# Patient Record
Sex: FEMALE | Race: WHITE | Employment: OTHER | URBAN - METROPOLITAN AREA
[De-identification: names, ages, dates, MRNs, and addresses within clinical notes are randomized per-mention and may not be internally consistent; named-entity substitution may affect disease eponyms.]

---

## 2018-03-22 ENCOUNTER — HOSPITAL ENCOUNTER (INPATIENT)
Age: 58
LOS: 4 days | Discharge: HOME | DRG: 871 | End: 2018-03-26
Attending: EMERGENCY MEDICINE | Admitting: INTERNAL MEDICINE
Payer: COMMERCIAL

## 2018-03-22 ENCOUNTER — APPOINTMENT (OUTPATIENT)
Dept: GENERAL RADIOLOGY | Age: 58
DRG: 871 | End: 2018-03-22
Payer: COMMERCIAL

## 2018-03-22 ENCOUNTER — APPOINTMENT (OUTPATIENT)
Dept: CT IMAGING | Age: 58
DRG: 871 | End: 2018-03-22
Payer: COMMERCIAL

## 2018-03-22 DIAGNOSIS — J18.9 PNEUMONIA DUE TO ORGANISM: ICD-10-CM

## 2018-03-22 DIAGNOSIS — E78.5 HYPERLIPIDEMIA, UNSPECIFIED HYPERLIPIDEMIA TYPE: ICD-10-CM

## 2018-03-22 DIAGNOSIS — R06.00 DYSPNEA AND RESPIRATORY ABNORMALITIES: Primary | ICD-10-CM

## 2018-03-22 DIAGNOSIS — C34.91 MALIGNANT NEOPLASM OF RIGHT LUNG, UNSPECIFIED PART OF LUNG (HCC): ICD-10-CM

## 2018-03-22 DIAGNOSIS — F41.9 ANXIETY: ICD-10-CM

## 2018-03-22 DIAGNOSIS — R09.02 HYPOXIA: ICD-10-CM

## 2018-03-22 DIAGNOSIS — R06.89 DYSPNEA AND RESPIRATORY ABNORMALITIES: Primary | ICD-10-CM

## 2018-03-22 PROBLEM — A41.9 SEPSIS (HCC): Status: ACTIVE | Noted: 2018-03-22

## 2018-03-22 LAB
ALBUMIN SERPL-MCNC: 3.6 G/DL (ref 3.5–5.2)
ALP BLD-CCNC: 97 U/L (ref 35–104)
ALT SERPL-CCNC: 16 U/L (ref 0–32)
ANION GAP SERPL CALCULATED.3IONS-SCNC: 16 MMOL/L (ref 7–16)
AST SERPL-CCNC: 21 U/L (ref 0–31)
BASOPHILS ABSOLUTE: 0.05 E9/L (ref 0–0.2)
BASOPHILS RELATIVE PERCENT: 0.4 % (ref 0–2)
BILIRUB SERPL-MCNC: 0.4 MG/DL (ref 0–1.2)
BUN BLDV-MCNC: 20 MG/DL (ref 6–20)
CALCIUM SERPL-MCNC: 8.9 MG/DL (ref 8.6–10.2)
CHLORIDE BLD-SCNC: 98 MMOL/L (ref 98–107)
CO2: 22 MMOL/L (ref 22–29)
CREAT SERPL-MCNC: 0.3 MG/DL (ref 0.5–1)
EOSINOPHILS ABSOLUTE: 0.09 E9/L (ref 0.05–0.5)
EOSINOPHILS RELATIVE PERCENT: 0.6 % (ref 0–6)
GFR AFRICAN AMERICAN: >60
GFR NON-AFRICAN AMERICAN: >60 ML/MIN/1.73
GLUCOSE BLD-MCNC: 99 MG/DL (ref 74–109)
HCT VFR BLD CALC: 37.8 % (ref 34–48)
HEMOGLOBIN: 12.9 G/DL (ref 11.5–15.5)
IMMATURE GRANULOCYTES #: 0.07 E9/L
IMMATURE GRANULOCYTES %: 0.5 % (ref 0–5)
INFLUENZA A BY PCR: NOT DETECTED
INFLUENZA B BY PCR: NOT DETECTED
LACTIC ACID: 0.8 MMOL/L (ref 0.5–2.2)
LYMPHOCYTES ABSOLUTE: 0.71 E9/L (ref 1.5–4)
LYMPHOCYTES RELATIVE PERCENT: 5.1 % (ref 20–42)
MCH RBC QN AUTO: 30.6 PG (ref 26–35)
MCHC RBC AUTO-ENTMCNC: 34.1 % (ref 32–34.5)
MCV RBC AUTO: 89.8 FL (ref 80–99.9)
MONOCYTES ABSOLUTE: 0.97 E9/L (ref 0.1–0.95)
MONOCYTES RELATIVE PERCENT: 7 % (ref 2–12)
NEUTROPHILS ABSOLUTE: 12.06 E9/L (ref 1.8–7.3)
NEUTROPHILS RELATIVE PERCENT: 86.4 % (ref 43–80)
PDW BLD-RTO: 14.2 FL (ref 11.5–15)
PLATELET # BLD: 311 E9/L (ref 130–450)
PMV BLD AUTO: 9.4 FL (ref 7–12)
POTASSIUM SERPL-SCNC: 4.3 MMOL/L (ref 3.5–5)
PRO-BNP: 66 PG/ML (ref 0–125)
PROCALCITONIN: 0.34 NG/ML (ref 0–0.08)
RBC # BLD: 4.21 E12/L (ref 3.5–5.5)
SODIUM BLD-SCNC: 136 MMOL/L (ref 132–146)
TOTAL PROTEIN: 7.2 G/DL (ref 6.4–8.3)
TROPONIN: <0.01 NG/ML (ref 0–0.03)
WBC # BLD: 14 E9/L (ref 4.5–11.5)

## 2018-03-22 PROCEDURE — 99285 EMERGENCY DEPT VISIT HI MDM: CPT

## 2018-03-22 PROCEDURE — 94761 N-INVAS EAR/PLS OXIMETRY MLT: CPT

## 2018-03-22 PROCEDURE — 2580000003 HC RX 258: Performed by: EMERGENCY MEDICINE

## 2018-03-22 PROCEDURE — 2580000003 HC RX 258: Performed by: INTERNAL MEDICINE

## 2018-03-22 PROCEDURE — 6360000004 HC RX CONTRAST MEDICATION: Performed by: RADIOLOGY

## 2018-03-22 PROCEDURE — 71045 X-RAY EXAM CHEST 1 VIEW: CPT

## 2018-03-22 PROCEDURE — 83605 ASSAY OF LACTIC ACID: CPT

## 2018-03-22 PROCEDURE — 87040 BLOOD CULTURE FOR BACTERIA: CPT

## 2018-03-22 PROCEDURE — 87081 CULTURE SCREEN ONLY: CPT

## 2018-03-22 PROCEDURE — 2060000000 HC ICU INTERMEDIATE R&B

## 2018-03-22 PROCEDURE — 85025 COMPLETE CBC W/AUTO DIFF WBC: CPT

## 2018-03-22 PROCEDURE — 6360000002 HC RX W HCPCS: Performed by: INTERNAL MEDICINE

## 2018-03-22 PROCEDURE — 87501 INFLUENZA DNA AMP PROB 1+: CPT

## 2018-03-22 PROCEDURE — 6370000000 HC RX 637 (ALT 250 FOR IP): Performed by: INTERNAL MEDICINE

## 2018-03-22 PROCEDURE — 87581 M.PNEUMON DNA AMP PROBE: CPT

## 2018-03-22 PROCEDURE — 93005 ELECTROCARDIOGRAM TRACING: CPT | Performed by: EMERGENCY MEDICINE

## 2018-03-22 PROCEDURE — 87503 INFLUENZA DNA AMP PROB ADDL: CPT

## 2018-03-22 PROCEDURE — 84484 ASSAY OF TROPONIN QUANT: CPT

## 2018-03-22 PROCEDURE — 6360000002 HC RX W HCPCS: Performed by: EMERGENCY MEDICINE

## 2018-03-22 PROCEDURE — 2580000003 HC RX 258: Performed by: SPECIALIST

## 2018-03-22 PROCEDURE — 96374 THER/PROPH/DIAG INJ IV PUSH: CPT

## 2018-03-22 PROCEDURE — 71275 CT ANGIOGRAPHY CHEST: CPT

## 2018-03-22 PROCEDURE — 83880 ASSAY OF NATRIURETIC PEPTIDE: CPT

## 2018-03-22 PROCEDURE — 80053 COMPREHEN METABOLIC PANEL: CPT

## 2018-03-22 PROCEDURE — 87798 DETECT AGENT NOS DNA AMP: CPT

## 2018-03-22 PROCEDURE — 84145 PROCALCITONIN (PCT): CPT

## 2018-03-22 PROCEDURE — 6360000002 HC RX W HCPCS: Performed by: SPECIALIST

## 2018-03-22 PROCEDURE — 36415 COLL VENOUS BLD VENIPUNCTURE: CPT

## 2018-03-22 PROCEDURE — 87486 CHLMYD PNEUM DNA AMP PROBE: CPT

## 2018-03-22 PROCEDURE — 87450 HC DIRECT STREP B ANTIGEN: CPT

## 2018-03-22 PROCEDURE — 87502 INFLUENZA DNA AMP PROBE: CPT

## 2018-03-22 RX ORDER — TRAMADOL HYDROCHLORIDE 50 MG/1
50 TABLET ORAL EVERY 6 HOURS PRN
COMMUNITY

## 2018-03-22 RX ORDER — LORAZEPAM 0.5 MG/1
0.5 TABLET ORAL EVERY 6 HOURS PRN
Status: DISCONTINUED | OUTPATIENT
Start: 2018-03-22 | End: 2018-03-26 | Stop reason: HOSPADM

## 2018-03-22 RX ORDER — ACETAMINOPHEN 325 MG/1
650 TABLET ORAL EVERY 4 HOURS PRN
Status: DISCONTINUED | OUTPATIENT
Start: 2018-03-22 | End: 2018-03-26 | Stop reason: HOSPADM

## 2018-03-22 RX ORDER — LEVOFLOXACIN 5 MG/ML
750 INJECTION, SOLUTION INTRAVENOUS EVERY 24 HOURS
Status: DISCONTINUED | OUTPATIENT
Start: 2018-03-22 | End: 2018-03-23

## 2018-03-22 RX ORDER — ONDANSETRON 2 MG/ML
4 INJECTION INTRAMUSCULAR; INTRAVENOUS EVERY 6 HOURS PRN
Status: DISCONTINUED | OUTPATIENT
Start: 2018-03-22 | End: 2018-03-26 | Stop reason: HOSPADM

## 2018-03-22 RX ORDER — SODIUM CHLORIDE 0.9 % (FLUSH) 0.9 %
10 SYRINGE (ML) INJECTION PRN
Status: DISCONTINUED | OUTPATIENT
Start: 2018-03-22 | End: 2018-03-26 | Stop reason: HOSPADM

## 2018-03-22 RX ORDER — HYDROCODONE BITARTRATE AND ACETAMINOPHEN 10; 325 MG/1; MG/1
1 TABLET ORAL EVERY 6 HOURS PRN
Status: DISCONTINUED | OUTPATIENT
Start: 2018-03-22 | End: 2018-03-23

## 2018-03-22 RX ORDER — SODIUM CHLORIDE 0.9 % (FLUSH) 0.9 %
10 SYRINGE (ML) INJECTION EVERY 12 HOURS SCHEDULED
Status: DISCONTINUED | OUTPATIENT
Start: 2018-03-22 | End: 2018-03-26 | Stop reason: HOSPADM

## 2018-03-22 RX ORDER — GABAPENTIN 600 MG/1
600 TABLET ORAL
Status: DISCONTINUED | OUTPATIENT
Start: 2018-03-22 | End: 2018-03-26 | Stop reason: HOSPADM

## 2018-03-22 RX ORDER — MORPHINE SULFATE 4 MG/ML
4 INJECTION, SOLUTION INTRAMUSCULAR; INTRAVENOUS ONCE
Status: COMPLETED | OUTPATIENT
Start: 2018-03-22 | End: 2018-03-22

## 2018-03-22 RX ORDER — LEVOTHYROXINE SODIUM 88 UG/1
88 TABLET ORAL
Status: DISCONTINUED | OUTPATIENT
Start: 2018-03-23 | End: 2018-03-26 | Stop reason: HOSPADM

## 2018-03-22 RX ORDER — SODIUM CHLORIDE 9 MG/ML
INJECTION, SOLUTION INTRAVENOUS CONTINUOUS
Status: DISCONTINUED | OUTPATIENT
Start: 2018-03-22 | End: 2018-03-26 | Stop reason: HOSPADM

## 2018-03-22 RX ADMIN — SODIUM CHLORIDE: 9 INJECTION, SOLUTION INTRAVENOUS at 14:14

## 2018-03-22 RX ADMIN — VANCOMYCIN HYDROCHLORIDE 1000 MG: 1 INJECTION, POWDER, LYOPHILIZED, FOR SOLUTION INTRAVENOUS at 15:15

## 2018-03-22 RX ADMIN — GABAPENTIN 600 MG: 600 TABLET ORAL at 15:21

## 2018-03-22 RX ADMIN — LORAZEPAM 0.5 MG: 0.5 TABLET ORAL at 21:15

## 2018-03-22 RX ADMIN — ENOXAPARIN SODIUM 40 MG: 40 INJECTION, SOLUTION INTRAVENOUS; SUBCUTANEOUS at 21:14

## 2018-03-22 RX ADMIN — ACETAMINOPHEN 650 MG: 325 TABLET ORAL at 22:46

## 2018-03-22 RX ADMIN — IOPAMIDOL 60 ML: 755 INJECTION, SOLUTION INTRAVENOUS at 08:29

## 2018-03-22 RX ADMIN — CEFEPIME HYDROCHLORIDE 2 G: 2 INJECTION, POWDER, FOR SOLUTION INTRAVENOUS at 12:05

## 2018-03-22 RX ADMIN — MORPHINE SULFATE 4 MG: 4 INJECTION, SOLUTION INTRAMUSCULAR; INTRAVENOUS at 11:14

## 2018-03-22 RX ADMIN — LEVOFLOXACIN 750 MG: 5 INJECTION, SOLUTION INTRAVENOUS at 15:15

## 2018-03-22 RX ADMIN — SODIUM CHLORIDE: 9 INJECTION, SOLUTION INTRAVENOUS at 21:17

## 2018-03-22 RX ADMIN — HYDROCODONE BITARTRATE AND ACETAMINOPHEN 1 TABLET: 10; 325 TABLET ORAL at 20:18

## 2018-03-22 RX ADMIN — HYDROCODONE BITARTRATE AND ACETAMINOPHEN 1 TABLET: 10; 325 TABLET ORAL at 14:14

## 2018-03-22 RX ADMIN — GABAPENTIN 600 MG: 600 TABLET ORAL at 21:14

## 2018-03-22 RX ADMIN — GABAPENTIN 600 MG: 600 TABLET ORAL at 18:37

## 2018-03-22 RX ADMIN — MEROPENEM 1 G: 1 INJECTION, POWDER, FOR SOLUTION INTRAVENOUS at 19:56

## 2018-03-22 ASSESSMENT — PAIN DESCRIPTION - FREQUENCY
FREQUENCY: CONTINUOUS
FREQUENCY: CONTINUOUS

## 2018-03-22 ASSESSMENT — PAIN DESCRIPTION - LOCATION
LOCATION: HIP

## 2018-03-22 ASSESSMENT — PAIN SCALES - GENERAL
PAINLEVEL_OUTOF10: 6
PAINLEVEL_OUTOF10: 6
PAINLEVEL_OUTOF10: 8
PAINLEVEL_OUTOF10: 0
PAINLEVEL_OUTOF10: 5
PAINLEVEL_OUTOF10: 7
PAINLEVEL_OUTOF10: 9
PAINLEVEL_OUTOF10: 5

## 2018-03-22 ASSESSMENT — PAIN DESCRIPTION - ORIENTATION
ORIENTATION: LEFT

## 2018-03-22 ASSESSMENT — PAIN DESCRIPTION - DESCRIPTORS
DESCRIPTORS: ACHING;CONSTANT;DISCOMFORT
DESCRIPTORS: ACHING;DISCOMFORT;CONSTANT
DESCRIPTORS: ACHING;CONSTANT;DISCOMFORT

## 2018-03-22 ASSESSMENT — PAIN DESCRIPTION - ONSET
ONSET: ON-GOING
ONSET: ON-GOING

## 2018-03-22 ASSESSMENT — PAIN DESCRIPTION - PAIN TYPE
TYPE: CHRONIC PAIN

## 2018-03-22 ASSESSMENT — PAIN DESCRIPTION - PROGRESSION: CLINICAL_PROGRESSION: NOT CHANGED

## 2018-03-22 NOTE — PLAN OF CARE
Problem: Falls - Risk of  Goal: Absence of falls  Outcome: Met This Shift      Problem: Respiratory Function - Compromised  Goal: Able to breathe comfortably  Able to breathe comfortably     Outcome: Met This Shift

## 2018-03-22 NOTE — ED NOTES
Medicated for right hip pain 7/10. Patient respirations relaxed.      Nirav Junior RN  03/22/18 2482

## 2018-03-22 NOTE — ED PROVIDER NOTES
Duration 86 ms    Q-T Interval 348 ms    QTc Calculation (Bazett) 455 ms    P Axis 65 degrees    R Axis -130 degrees    T Axis 55 degrees       RADIOLOGY:  Interpreted by Radiologist.  CTA PULMONARY W CONTRAST   Final Result   1. No pulmonary thousand. 2. Infiltrative right perihilar mass with obstruction of the bronchus   intermedius and consolidation and atelectasis of the right middle and   lower lobes. Masslike tissue extends across the infrahilar region   along the left bronchus. Significant volume loss in the right   hemithorax. 3. Multiple bilateral pulmonary nodules, larger on the left since the   prior study, concerning for metastatic disease. 4. Small left pleural effusion. Small pericardial effusion. 5. Osteoblastic lesions throughout the spine and left and right ribs,   concerning for metastatic disease. XR CHEST PORTABLE   Final Result   ALERT:  THIS IS AN ABNORMAL REPORT   1. Persistent right basilar opacification concerning for malignancy in   this patient with a history of cancer of the bronchus of the right   lower lobe. 2. Possible airspace disease in the left lung base. 3. Pulmonary nodules on comparison CT scan chest 2018, not well   appreciated on this study. EK BPM, tachy, normal sinus, no acute ST/T, no STEMI.     ------------------------- NURSING NOTES AND VITALS REVIEWED ---------------------------   The nursing notes within the ED encounter and vital signs as below have been reviewed.    /83   Pulse 101   Temp 97.6 °F (36.4 °C) (Oral)   Resp 18   Ht 5' 3\" (1.6 m)   Wt 111 lb 4.8 oz (50.5 kg)   SpO2 96%   BMI 19.72 kg/m²   Oxygen Saturation Interpretation: Normal, improved at rest and with O2    ---------------------------------------------------PHYSICAL EXAM--------------------------------------    Constitutional/General: Alert and oriented x3, well appearing, non toxic in NAD  Head: NC/AT  Eyes: PERRL, EOMI  HENT: Oropharynx clear  Neck: Supple, full ROM  Pulmonary: Right lung veery diminished. Left lung clear to auscultation, no wheezes, rales, or rhonchi. Not in respiratory distress. Cardiovascular: Regular rate. Regular rhythm, no murmurs, gallops, or rubs. 2+ distal pulses. Abdomen: Soft, non tender, non distended. No guarding, rebound, or rigidity. Extremities: Moves all extremities x 4. Warm and well perfused. No edema. Skin: Warm and dry without rash. Neurologic: GCS 15, no focal deficits, systemic strength 5/5 to all extremities symmetrically, CN II-XII grossly intact.   Psych: Normal Affect      ------------------------------ ED COURSE/MEDICAL DECISION MAKING----------------------  Medications   levofloxacin (LEVAQUIN) 750 MG/150ML infusion 750 mg (750 mg Intravenous New Bag 3/22/18 1515)   sodium chloride flush 0.9 % injection 10 mL (not administered)   sodium chloride flush 0.9 % injection 10 mL (not administered)   acetaminophen (TYLENOL) tablet 650 mg (not administered)   magnesium hydroxide (MILK OF MAGNESIA) 400 MG/5ML suspension 30 mL (not administered)   ondansetron (ZOFRAN) injection 4 mg (not administered)   enoxaparin (LOVENOX) injection 40 mg (not administered)   0.9 % sodium chloride infusion ( Intravenous New Bag 3/22/18 1414)   gabapentin (NEURONTIN) tablet 600 mg (600 mg Oral Given 3/22/18 1521)   HYDROcodone-acetaminophen (NORCO)  MG per tablet 1 tablet (1 tablet Oral Given 3/22/18 1414)   levothyroxine (SYNTHROID) tablet 88 mcg (not administered)   LORazepam (ATIVAN) tablet 0.5 mg (not administered)   cefepime (MAXIPIME) 2 g IVPB extended (mini-bag) (not administered)   vancomycin 1000 mg IVPB in 250 mL D5W addavial (not administered)   iopamidol (ISOVUE-370) 76 % injection 60 mL (60 mLs Intravenous Given 3/22/18 1358)   morphine injection 4 mg (4 mg Intravenous Given 3/22/18 1114)   vancomycin 1000 mg IVPB in 250 mL D5W addavial (1,000 mg Intravenous New Bag 3/22/18 1515)   cefepime (MAXIPIME) 2 g performed by me.              Salo Terrazas, DO  03/22/18 9220

## 2018-03-22 NOTE — ED NOTES
Patient 88% on RA, dropped to 84% with ambulation. Walked by [employer]. Witnessed by me as well.      Joseph Underwood,   03/22/18 9109

## 2018-03-22 NOTE — H&P
constipation, melena, hematochezia, hematemesis  Genitourinary: dysuria, hematuria, or increase in frequency  Musculoskeletal: lower extremity edema, myalgias, arthralgias, back pain  Integumentary: rashes, itching   Neurological: headache, lightheadedness, dizziness, confusion, syncope, numbness, tingling, focal weakness  Psychiatric: depression, suicidal ideation, or anxiety  Endocrine: unintentional weight change  Hematologic/Lymphatic: lymphadenopathy, easy bruising, easy bleeding   Allergic/Immunologic: recurrent infections      Objective  VITALS:  BP (!) 143/66   Pulse 117   Temp 97.7 °F (36.5 °C) (Oral)   Resp 16   Ht 5' 3\" (1.6 m)   Wt 112 lb 12.8 oz (51.2 kg)   SpO2 96%   BMI 19.98 kg/m²     Physical Exam:  General: awake, alert, oriented to person, place, time, and purpose, appears stated age, cooperative, no acute distress, pleasant, appropriate mood, nasal cannula oxygen  Eyes: conjunctivae/corneas clear, sclera non icteric, EOMI  Ears: no obvious scars, no lesions, no masses, hearing intact  Mouth: mucous membranes moist, no obvious oral sores  Head: normocephalic, atraumatic  Neck: no JVD, no adenopathy, no thyromegaly, neck is supple, trachea is midline  Back: ROM normal, no CVA tenderness. Chest: no pain on palpation  Lungs:  There are decreased breath sounds on the right  Heart: regular rate and regular rhythm, no murmur, normal S1, S2  Abdomen: soft, non-tender; bowel sounds normal; no masses, no organomegaly  Extremities: no lower extremity edema, extremities atraumatic, no cyanosis, no clubbing, 2+ pedal pulses palpated  Skin: normal color, normal texture, normal turgor, no rashes, no lesions  Neurologic:5/5 muscle strength throughout, normal muscle tone throughout, face symmetric, hearing intact, tongue midline, speech appropriate without slurring, sensation to fine touch intact in upper and lower extremities    Labs-   Lab Results   Component Value Date    WBC 19.4 (H) 03/23/2018    HGB 13.7 03/23/2018    HCT 39.7 03/23/2018     03/23/2018     03/23/2018    K 3.7 03/23/2018    CL 97 (L) 03/23/2018    CREATININE 0.4 (L) 03/23/2018    BUN 6 03/23/2018    CO2 22 03/23/2018    GLUCOSE 82 03/23/2018    ALT 16 03/23/2018    AST 18 03/23/2018     Lab Results   Component Value Date    TROPONINI <0.01 03/22/2018         Recent Radiological Studies:  CTA PULMONARY W CONTRAST   Final Result   1. No pulmonary thousand. 2. Infiltrative right perihilar mass with obstruction of the bronchus   intermedius and consolidation and atelectasis of the right middle and   lower lobes. Masslike tissue extends across the infrahilar region   along the left bronchus. Significant volume loss in the right   hemithorax. 3. Multiple bilateral pulmonary nodules, larger on the left since the   prior study, concerning for metastatic disease. 4. Small left pleural effusion. Small pericardial effusion. 5. Osteoblastic lesions throughout the spine and left and right ribs,   concerning for metastatic disease. XR CHEST PORTABLE   Final Result   ALERT:  THIS IS AN ABNORMAL REPORT   1. Persistent right basilar opacification concerning for malignancy in   this patient with a history of cancer of the bronchus of the right   lower lobe. 2. Possible airspace disease in the left lung base. 3. Pulmonary nodules on comparison CT scan chest 1/18/2018, not well   appreciated on this study. Assessment  Patient Active Problem List    Diagnosis Date Noted    Pneumonia 03/22/2018     Priority: High    Sepsis ([address] 03/22/2018     Priority: Medium    Hypoxia 03/22/2018     Priority: Medium    Lung cancer ([facility] [address]      Priority: Medium    Hyperlipidemia     Anxiety        Plan  Postobstructive pneumonia/Sepsis/hypoxia: CXR/CTA chest noted. ATB's Per ID. Blood cx. IVF. Pneumococcal/legionella urine ag's/RFA Negative. Procalcitonin elevated. Pulmonology consultation pending.   Lung cancer with

## 2018-03-23 LAB
ALBUMIN SERPL-MCNC: 3.4 G/DL (ref 3.5–5.2)
ALP BLD-CCNC: 100 U/L (ref 35–104)
ALT SERPL-CCNC: 16 U/L (ref 0–32)
ANION GAP SERPL CALCULATED.3IONS-SCNC: 16 MMOL/L (ref 7–16)
AST SERPL-CCNC: 18 U/L (ref 0–31)
BASOPHILS ABSOLUTE: 0.06 E9/L (ref 0–0.2)
BASOPHILS RELATIVE PERCENT: 0.3 % (ref 0–2)
BILIRUB SERPL-MCNC: 0.5 MG/DL (ref 0–1.2)
BUN BLDV-MCNC: 6 MG/DL (ref 6–20)
CALCIUM SERPL-MCNC: 8.7 MG/DL (ref 8.6–10.2)
CHLORIDE BLD-SCNC: 97 MMOL/L (ref 98–107)
CO2: 22 MMOL/L (ref 22–29)
CREAT SERPL-MCNC: 0.4 MG/DL (ref 0.5–1)
EOSINOPHILS ABSOLUTE: 0.11 E9/L (ref 0.05–0.5)
EOSINOPHILS RELATIVE PERCENT: 0.6 % (ref 0–6)
FILM ARRAY ADENOVIRUS: NORMAL
FILM ARRAY BORDETELLA PERTUSSIS: NORMAL
FILM ARRAY CHLAMYDOPHILIA PNEUMONIAE: NORMAL
FILM ARRAY CORONAVIRUS 229E: NORMAL
FILM ARRAY CORONAVIRUS HKU1: NORMAL
FILM ARRAY CORONAVIRUS NL63: NORMAL
FILM ARRAY CORONAVIRUS OC43: NORMAL
FILM ARRAY INFLUENZA A VIRUS 09H1: NORMAL
FILM ARRAY INFLUENZA A VIRUS H1: NORMAL
FILM ARRAY INFLUENZA A VIRUS H3: NORMAL
FILM ARRAY INFLUENZA A VIRUS: NORMAL
FILM ARRAY INFLUENZA B: NORMAL
FILM ARRAY METAPNEUMOVIRUS: NORMAL
FILM ARRAY MYCOPLASMA PNEUMONIAE: NORMAL
FILM ARRAY PARAINFLUENZA VIRUS 1: NORMAL
FILM ARRAY PARAINFLUENZA VIRUS 2: NORMAL
FILM ARRAY PARAINFLUENZA VIRUS 3: NORMAL
FILM ARRAY PARAINFLUENZA VIRUS 4: NORMAL
FILM ARRAY RESPIRATORY SYNCITIAL VIRUS: NORMAL
FILM ARRAY RHINOVIRUS/ENTEROVIRUS: NORMAL
GFR AFRICAN AMERICAN: >60
GFR NON-AFRICAN AMERICAN: >60 ML/MIN/1.73
GLUCOSE BLD-MCNC: 82 MG/DL (ref 74–109)
HCT VFR BLD CALC: 39.7 % (ref 34–48)
HEMOGLOBIN: 13.7 G/DL (ref 11.5–15.5)
IMMATURE GRANULOCYTES #: 0.1 E9/L
IMMATURE GRANULOCYTES %: 0.5 % (ref 0–5)
L. PNEUMOPHILA SEROGP 1 UR AG: NORMAL
LYMPHOCYTES ABSOLUTE: 1 E9/L (ref 1.5–4)
LYMPHOCYTES RELATIVE PERCENT: 5.2 % (ref 20–42)
MCH RBC QN AUTO: 30.7 PG (ref 26–35)
MCHC RBC AUTO-ENTMCNC: 34.5 % (ref 32–34.5)
MCV RBC AUTO: 89 FL (ref 80–99.9)
MONOCYTES ABSOLUTE: 1.38 E9/L (ref 0.1–0.95)
MONOCYTES RELATIVE PERCENT: 7.1 % (ref 2–12)
NEUTROPHILS ABSOLUTE: 16.72 E9/L (ref 1.8–7.3)
NEUTROPHILS RELATIVE PERCENT: 86.3 % (ref 43–80)
PDW BLD-RTO: 13.9 FL (ref 11.5–15)
PLATELET # BLD: 357 E9/L (ref 130–450)
PMV BLD AUTO: 9.6 FL (ref 7–12)
POTASSIUM REFLEX MAGNESIUM: 3.7 MMOL/L (ref 3.5–5)
RBC # BLD: 4.46 E12/L (ref 3.5–5.5)
SODIUM BLD-SCNC: 135 MMOL/L (ref 132–146)
STREP PNEUMONIAE ANTIGEN, URINE: NORMAL
TOTAL PROTEIN: 7 G/DL (ref 6.4–8.3)
WBC # BLD: 19.4 E9/L (ref 4.5–11.5)

## 2018-03-23 PROCEDURE — 6360000002 HC RX W HCPCS: Performed by: INTERNAL MEDICINE

## 2018-03-23 PROCEDURE — 94667 MNPJ CHEST WALL 1ST: CPT

## 2018-03-23 PROCEDURE — 2060000000 HC ICU INTERMEDIATE R&B

## 2018-03-23 PROCEDURE — 80053 COMPREHEN METABOLIC PANEL: CPT

## 2018-03-23 PROCEDURE — 6360000002 HC RX W HCPCS: Performed by: SPECIALIST

## 2018-03-23 PROCEDURE — 6370000000 HC RX 637 (ALT 250 FOR IP): Performed by: INTERNAL MEDICINE

## 2018-03-23 PROCEDURE — 85025 COMPLETE CBC W/AUTO DIFF WBC: CPT

## 2018-03-23 PROCEDURE — 36415 COLL VENOUS BLD VENIPUNCTURE: CPT

## 2018-03-23 PROCEDURE — 2580000003 HC RX 258: Performed by: INTERNAL MEDICINE

## 2018-03-23 PROCEDURE — 2580000003 HC RX 258: Performed by: SPECIALIST

## 2018-03-23 PROCEDURE — 94640 AIRWAY INHALATION TREATMENT: CPT

## 2018-03-23 PROCEDURE — 2700000000 HC OXYGEN THERAPY PER DAY

## 2018-03-23 RX ORDER — ALBUTEROL SULFATE 2.5 MG/3ML
2.5 SOLUTION RESPIRATORY (INHALATION) 4 TIMES DAILY
Status: DISCONTINUED | OUTPATIENT
Start: 2018-03-23 | End: 2018-03-26

## 2018-03-23 RX ORDER — GUAIFENESIN 400 MG/1
400 TABLET ORAL 4 TIMES DAILY
Status: DISCONTINUED | OUTPATIENT
Start: 2018-03-23 | End: 2018-03-26 | Stop reason: HOSPADM

## 2018-03-23 RX ORDER — OXYCODONE HYDROCHLORIDE AND ACETAMINOPHEN 5; 325 MG/1; MG/1
2 TABLET ORAL EVERY 4 HOURS PRN
Status: DISCONTINUED | OUTPATIENT
Start: 2018-03-23 | End: 2018-03-26 | Stop reason: HOSPADM

## 2018-03-23 RX ORDER — OXYCODONE HYDROCHLORIDE AND ACETAMINOPHEN 5; 325 MG/1; MG/1
1 TABLET ORAL EVERY 4 HOURS PRN
Status: DISCONTINUED | OUTPATIENT
Start: 2018-03-23 | End: 2018-03-26 | Stop reason: HOSPADM

## 2018-03-23 RX ADMIN — GUAIFENESIN 400 MG: 400 TABLET ORAL at 19:00

## 2018-03-23 RX ADMIN — OXYCODONE HYDROCHLORIDE AND ACETAMINOPHEN 1 TABLET: 5; 325 TABLET ORAL at 16:29

## 2018-03-23 RX ADMIN — GABAPENTIN 600 MG: 600 TABLET ORAL at 11:08

## 2018-03-23 RX ADMIN — GABAPENTIN 600 MG: 600 TABLET ORAL at 16:32

## 2018-03-23 RX ADMIN — ALBUTEROL SULFATE 2.5 MG: 2.5 SOLUTION RESPIRATORY (INHALATION) at 21:33

## 2018-03-23 RX ADMIN — GABAPENTIN 600 MG: 600 TABLET ORAL at 20:50

## 2018-03-23 RX ADMIN — HYDROCODONE BITARTRATE AND ACETAMINOPHEN 1 TABLET: 10; 325 TABLET ORAL at 08:34

## 2018-03-23 RX ADMIN — HYDROCODONE BITARTRATE AND ACETAMINOPHEN 1 TABLET: 10; 325 TABLET ORAL at 02:25

## 2018-03-23 RX ADMIN — ACETAMINOPHEN 650 MG: 325 TABLET ORAL at 11:08

## 2018-03-23 RX ADMIN — ALBUTEROL SULFATE 2.5 MG: 2.5 SOLUTION RESPIRATORY (INHALATION) at 17:25

## 2018-03-23 RX ADMIN — GUAIFENESIN 400 MG: 400 TABLET ORAL at 20:50

## 2018-03-23 RX ADMIN — MEROPENEM 1 G: 1 INJECTION, POWDER, FOR SOLUTION INTRAVENOUS at 12:36

## 2018-03-23 RX ADMIN — GABAPENTIN 600 MG: 600 TABLET ORAL at 19:04

## 2018-03-23 RX ADMIN — GABAPENTIN 600 MG: 600 TABLET ORAL at 14:27

## 2018-03-23 RX ADMIN — GABAPENTIN 600 MG: 600 TABLET ORAL at 05:37

## 2018-03-23 RX ADMIN — OXYCODONE HYDROCHLORIDE AND ACETAMINOPHEN 2 TABLET: 5; 325 TABLET ORAL at 12:36

## 2018-03-23 RX ADMIN — LORAZEPAM 0.5 MG: 0.5 TABLET ORAL at 05:39

## 2018-03-23 RX ADMIN — LEVOTHYROXINE SODIUM 88 MCG: 0.09 TABLET ORAL at 05:37

## 2018-03-23 RX ADMIN — OXYCODONE HYDROCHLORIDE AND ACETAMINOPHEN 1 TABLET: 5; 325 TABLET ORAL at 20:49

## 2018-03-23 RX ADMIN — ACETAMINOPHEN 650 MG: 325 TABLET ORAL at 04:28

## 2018-03-23 RX ADMIN — MEROPENEM 1 G: 1 INJECTION, POWDER, FOR SOLUTION INTRAVENOUS at 20:49

## 2018-03-23 RX ADMIN — SODIUM CHLORIDE: 9 INJECTION, SOLUTION INTRAVENOUS at 22:33

## 2018-03-23 RX ADMIN — MEROPENEM 1 G: 1 INJECTION, POWDER, FOR SOLUTION INTRAVENOUS at 04:41

## 2018-03-23 RX ADMIN — LORAZEPAM 0.5 MG: 0.5 TABLET ORAL at 23:09

## 2018-03-23 RX ADMIN — ENOXAPARIN SODIUM 40 MG: 40 INJECTION, SOLUTION INTRAVENOUS; SUBCUTANEOUS at 20:50

## 2018-03-23 ASSESSMENT — PAIN SCALES - GENERAL
PAINLEVEL_OUTOF10: 7
PAINLEVEL_OUTOF10: 8
PAINLEVEL_OUTOF10: 5
PAINLEVEL_OUTOF10: 0
PAINLEVEL_OUTOF10: 0
PAINLEVEL_OUTOF10: 3
PAINLEVEL_OUTOF10: 10
PAINLEVEL_OUTOF10: 10
PAINLEVEL_OUTOF10: 8
PAINLEVEL_OUTOF10: 6
PAINLEVEL_OUTOF10: 10

## 2018-03-23 ASSESSMENT — PAIN DESCRIPTION - DESCRIPTORS
DESCRIPTORS: ACHING;DISCOMFORT
DESCRIPTORS: ACHING;DISCOMFORT;CONSTANT
DESCRIPTORS: ACHING;DISCOMFORT;CONSTANT
DESCRIPTORS: ACHING;CONSTANT;DISCOMFORT

## 2018-03-23 ASSESSMENT — PAIN DESCRIPTION - ORIENTATION
ORIENTATION: LEFT

## 2018-03-23 ASSESSMENT — PAIN DESCRIPTION - LOCATION
LOCATION: HIP

## 2018-03-23 ASSESSMENT — PAIN DESCRIPTION - PAIN TYPE
TYPE: CHRONIC PAIN

## 2018-03-23 NOTE — PROGRESS NOTES
Results   Component Value Date     03/23/2018    K 3.7 03/23/2018    CL 97 03/23/2018    CO2 22 03/23/2018    BUN 6 03/23/2018    CREATININE 0.4 03/23/2018    GFRAA >60 03/23/2018    LABGLOM >60 03/23/2018    GLUCOSE 82 03/23/2018    PROT 7.0 03/23/2018    LABALBU 3.4 03/23/2018    CALCIUM 8.7 03/23/2018    BILITOT 0.5 03/23/2018    ALKPHOS 100 03/23/2018    AST 18 03/23/2018    ALT 16 03/23/2018       BP (!) 143/66   Pulse 117   Temp 97.7 °F (36.5 °C) (Oral)   Resp 16   Ht 5' 3\" (1.6 m)   Wt 112 lb 12.8 oz (51.2 kg)   SpO2 96%   BMI 19.98 kg/m²     Physical Exam  Const/Neuro- unchanged, no signs of acute distress, Alert  ENMT- Within Normal Limits, Normocephalic, mucous membranes pink/moist, No thrush  Neck: Neck supple  Heart- Regular, Rate, Rhythm- no murmur appreciated. Lungs- DECREASE BS ON RT TO ascultation. Respirations even and nonlabored. Abdomen- Soft, bowel sounds positive, non tender  Musculo/Extremities-  Equal and symmetrical, no edema. No tenderness. Skin:  Warm and dry, free from rashes. Cultures reviewed    Radiology reviewed  CTA PULMONARY W CONTRAST   Final Result   1. No pulmonary thousand. 2. Infiltrative right perihilar mass with obstruction of the bronchus   intermedius and consolidation and atelectasis of the right middle and   lower lobes. Masslike tissue extends across the infrahilar region   along the left bronchus. Significant volume loss in the right   hemithorax. 3. Multiple bilateral pulmonary nodules, larger on the left since the   prior study, concerning for metastatic disease. 4. Small left pleural effusion. Small pericardial effusion. 5. Osteoblastic lesions throughout the spine and left and right ribs,   concerning for metastatic disease. XR CHEST PORTABLE   Final Result   ALERT:  THIS IS AN ABNORMAL REPORT   1.  Persistent right basilar opacification concerning for malignancy in   this patient with a history of cancer of the

## 2018-03-23 NOTE — PROGRESS NOTES
Subjective: The patient is awake and alert. No problems overnight. Has cough, dyspnea and shortness of breath, improved with being on supplemental oxygen. Denies chest pain, angina, abdominal discomfort, nausea or vomiting. Tolerating diet. Objective:    BP (!) 143/66   Pulse 117   Temp 97.7 °F (36.5 °C) (Oral)   Resp 16   Ht 5' 3\" (1.6 m)   Wt 112 lb 12.8 oz (51.2 kg)   SpO2 96%   BMI 19.98 kg/m²     General: NAD, 2L nc  HEENT: No thrush or mucositis, EOMI, PERRLA  Heart:  Sinus tachycardia, no murmurs, gallops, or rubs.   Lungs:  Decreased BS bilaterally worse on the right, with inspiratory rales and rhonchi on the right  Abd: bowel sounds present, nontender, nondistended, no masses  Extrem:  No clubbing, cyanosis, or edema  Lymphatics: No palpable adenopathy in cervical and supraclavicular regions  Skin: Intact, no petechia or purpura    CBC with Differential:    Lab Results   Component Value Date    WBC 19.4 03/23/2018    RBC 4.46 03/23/2018    HGB 13.7 03/23/2018    HCT 39.7 03/23/2018     03/23/2018    MCV 89.0 03/23/2018    MCH 30.7 03/23/2018    MCHC 34.5 03/23/2018    RDW 13.9 03/23/2018    LYMPHOPCT 5.2 03/23/2018    MONOPCT 7.1 03/23/2018    BASOPCT 0.3 03/23/2018    MONOSABS 1.38 03/23/2018    LYMPHSABS 1.00 03/23/2018    EOSABS 0.11 03/23/2018    BASOSABS 0.06 03/23/2018     CMP:    Lab Results   Component Value Date     03/23/2018    K 3.7 03/23/2018    CL 97 03/23/2018    CO2 22 03/23/2018    BUN 6 03/23/2018    CREATININE 0.4 03/23/2018    GFRAA >60 03/23/2018    LABGLOM >60 03/23/2018    GLUCOSE 82 03/23/2018    PROT 7.0 03/23/2018    LABALBU 3.4 03/23/2018    CALCIUM 8.7 03/23/2018    BILITOT 0.5 03/23/2018    ALKPHOS 100 03/23/2018    AST 18 03/23/2018    ALT 16 03/23/2018      [id number]}     Current Facility-Administered Medications:     sodium chloride flush 0.9 % injection 10 mL, 10 mL, Intravenous, 2 times per day, Zeus Oviedo, DO    sodium chloride flush 0.9 % injection 10 mL, 10 mL, Intravenous, PRN, Zeus Fowlerino, DO    acetaminophen (TYLENOL) tablet 650 mg, 650 mg, Oral, Q4H PRN, Zeus E Volino, DO, 650 mg at 03/23/18 1108    magnesium hydroxide (MILK OF MAGNESIA) 400 MG/5ML suspension 30 mL, 30 mL, Oral, Daily PRN, Zeus ROBERTO Volino, DO    ondansetron (ZOFRAN) injection 4 mg, 4 mg, Intravenous, Q6H PRN, Zeus ROBERTO Volino, DO    enoxaparin (LOVENOX) injection 40 mg, 40 mg, Subcutaneous, Daily, Zeus Oviedo, , 40 mg at 03/22/18 2114    0.9 % sodium chloride infusion, , Intravenous, Continuous, Zeus Oviedo DO, Last Rate: 100 mL/hr at 03/22/18 2117    gabapentin (NEURONTIN) tablet 600 mg, 600 mg, Oral, 6x Daily, Zeus Fowlerino, DO, 600 mg at 03/23/18 1108    HYDROcodone-acetaminophen (NORCO)  MG per tablet 1 tablet, 1 tablet, Oral, Q6H PRN, Zeus Oviedo, DO, 1 tablet at 03/23/18 0834    levothyroxine (SYNTHROID) tablet 88 mcg, 88 mcg, Oral, QAM AC, Zeus Fowlerino, DO, 88 mcg at 03/23/18 0537    LORazepam (ATIVAN) tablet 0.5 mg, 0.5 mg, Oral, Q6H PRN, Zeus Fowlerino, DO, 0.5 mg at 03/23/18 0539    meropenem (MERREM) 1 g in sodium chloride 0.9 % 100 mL IVPB, 1 g, Intravenous, Q8H, Lizy Jane MD, Stopped at 03/23/18 0510    Assessment:    Principal Problem:    Pneumonia  Active Problems:    Lung cancer ([facility] [address]    Sepsis ([facility] [address]    Hypoxia  Resolved Problems:    * No resolved hospital problems. *    57 yo female known to Dr. Sheri Dey with stage IV lung adenocarcinoma who has been on second line immunotherapy with Opdivo since 1/26/18, last dose on 3/9/18. Admitted with increased cough, shortness of breath, dyspnea and chills. Plan:  Discussed there is evidence of disease progression on CTA chest from 3/22 with occluded right bronchus intermedius with RML and RLL atelectasis and increased left lung nodules. Reviewed images personally. She will be following up with Dr. Sheri Dey regarding change of therapy next week.     Pulmonary consulted, may benefit from bronchoscopic evaluation. Cont supportive care, supplemental oxygen and abx per ID.        Electronically signed by Kiet Huynh MD on 3/23/2018 at 12:07 PM

## 2018-03-24 LAB
ALBUMIN SERPL-MCNC: 3.2 G/DL (ref 3.5–5.2)
ALP BLD-CCNC: 94 U/L (ref 35–104)
ALT SERPL-CCNC: 20 U/L (ref 0–32)
ANION GAP SERPL CALCULATED.3IONS-SCNC: 16 MMOL/L (ref 7–16)
AST SERPL-CCNC: 16 U/L (ref 0–31)
BASOPHILS ABSOLUTE: 0.07 E9/L (ref 0–0.2)
BASOPHILS RELATIVE PERCENT: 0.4 % (ref 0–2)
BILIRUB SERPL-MCNC: 0.3 MG/DL (ref 0–1.2)
BUN BLDV-MCNC: 7 MG/DL (ref 6–20)
CALCIUM SERPL-MCNC: 8.6 MG/DL (ref 8.6–10.2)
CHLORIDE BLD-SCNC: 98 MMOL/L (ref 98–107)
CO2: 23 MMOL/L (ref 22–29)
CREAT SERPL-MCNC: 0.4 MG/DL (ref 0.5–1)
EOSINOPHILS ABSOLUTE: 0.11 E9/L (ref 0.05–0.5)
EOSINOPHILS RELATIVE PERCENT: 0.7 % (ref 0–6)
GFR AFRICAN AMERICAN: >60
GFR NON-AFRICAN AMERICAN: >60 ML/MIN/1.73
GLUCOSE BLD-MCNC: 120 MG/DL (ref 74–109)
HCT VFR BLD CALC: 38.8 % (ref 34–48)
HEMOGLOBIN: 13.4 G/DL (ref 11.5–15.5)
IMMATURE GRANULOCYTES #: 0.09 E9/L
IMMATURE GRANULOCYTES %: 0.6 % (ref 0–5)
LYMPHOCYTES ABSOLUTE: 0.82 E9/L (ref 1.5–4)
LYMPHOCYTES RELATIVE PERCENT: 5.2 % (ref 20–42)
MAGNESIUM: 1.9 MG/DL (ref 1.6–2.6)
MCH RBC QN AUTO: 30.7 PG (ref 26–35)
MCHC RBC AUTO-ENTMCNC: 34.5 % (ref 32–34.5)
MCV RBC AUTO: 88.8 FL (ref 80–99.9)
MONOCYTES ABSOLUTE: 1.2 E9/L (ref 0.1–0.95)
MONOCYTES RELATIVE PERCENT: 7.6 % (ref 2–12)
MRSA CULTURE ONLY: NORMAL
NEUTROPHILS ABSOLUTE: 13.58 E9/L (ref 1.8–7.3)
NEUTROPHILS RELATIVE PERCENT: 85.5 % (ref 43–80)
PDW BLD-RTO: 13.7 FL (ref 11.5–15)
PLATELET # BLD: 321 E9/L (ref 130–450)
PMV BLD AUTO: 9.3 FL (ref 7–12)
POTASSIUM REFLEX MAGNESIUM: 3.1 MMOL/L (ref 3.5–5)
RBC # BLD: 4.37 E12/L (ref 3.5–5.5)
SODIUM BLD-SCNC: 137 MMOL/L (ref 132–146)
TOTAL PROTEIN: 6.5 G/DL (ref 6.4–8.3)
WBC # BLD: 15.9 E9/L (ref 4.5–11.5)

## 2018-03-24 PROCEDURE — 6370000000 HC RX 637 (ALT 250 FOR IP): Performed by: INTERNAL MEDICINE

## 2018-03-24 PROCEDURE — 36415 COLL VENOUS BLD VENIPUNCTURE: CPT

## 2018-03-24 PROCEDURE — 2580000003 HC RX 258: Performed by: SPECIALIST

## 2018-03-24 PROCEDURE — 85025 COMPLETE CBC W/AUTO DIFF WBC: CPT

## 2018-03-24 PROCEDURE — 83735 ASSAY OF MAGNESIUM: CPT

## 2018-03-24 PROCEDURE — 80053 COMPREHEN METABOLIC PANEL: CPT

## 2018-03-24 PROCEDURE — 2060000000 HC ICU INTERMEDIATE R&B

## 2018-03-24 PROCEDURE — 94668 MNPJ CHEST WALL SBSQ: CPT

## 2018-03-24 PROCEDURE — 94640 AIRWAY INHALATION TREATMENT: CPT

## 2018-03-24 PROCEDURE — 6360000002 HC RX W HCPCS: Performed by: SPECIALIST

## 2018-03-24 PROCEDURE — 2700000000 HC OXYGEN THERAPY PER DAY

## 2018-03-24 PROCEDURE — 6360000002 HC RX W HCPCS: Performed by: INTERNAL MEDICINE

## 2018-03-24 RX ORDER — POTASSIUM CHLORIDE 20 MEQ/1
20 TABLET, EXTENDED RELEASE ORAL 2 TIMES DAILY WITH MEALS
Status: DISCONTINUED | OUTPATIENT
Start: 2018-03-24 | End: 2018-03-26 | Stop reason: HOSPADM

## 2018-03-24 RX ADMIN — ENOXAPARIN SODIUM 40 MG: 40 INJECTION, SOLUTION INTRAVENOUS; SUBCUTANEOUS at 20:40

## 2018-03-24 RX ADMIN — GUAIFENESIN 400 MG: 400 TABLET ORAL at 20:39

## 2018-03-24 RX ADMIN — ALBUTEROL SULFATE 2.5 MG: 2.5 SOLUTION RESPIRATORY (INHALATION) at 12:19

## 2018-03-24 RX ADMIN — ALBUTEROL SULFATE 2.5 MG: 2.5 SOLUTION RESPIRATORY (INHALATION) at 21:54

## 2018-03-24 RX ADMIN — OXYCODONE HYDROCHLORIDE AND ACETAMINOPHEN 2 TABLET: 5; 325 TABLET ORAL at 05:00

## 2018-03-24 RX ADMIN — GUAIFENESIN 400 MG: 400 TABLET ORAL at 12:58

## 2018-03-24 RX ADMIN — OXYCODONE HYDROCHLORIDE AND ACETAMINOPHEN 2 TABLET: 5; 325 TABLET ORAL at 09:17

## 2018-03-24 RX ADMIN — MEROPENEM 1 G: 1 INJECTION, POWDER, FOR SOLUTION INTRAVENOUS at 20:39

## 2018-03-24 RX ADMIN — GABAPENTIN 600 MG: 600 TABLET ORAL at 20:39

## 2018-03-24 RX ADMIN — GABAPENTIN 600 MG: 600 TABLET ORAL at 12:58

## 2018-03-24 RX ADMIN — GUAIFENESIN 400 MG: 400 TABLET ORAL at 18:46

## 2018-03-24 RX ADMIN — GABAPENTIN 600 MG: 600 TABLET ORAL at 18:46

## 2018-03-24 RX ADMIN — OXYCODONE HYDROCHLORIDE AND ACETAMINOPHEN 2 TABLET: 5; 325 TABLET ORAL at 21:21

## 2018-03-24 RX ADMIN — LEVOTHYROXINE SODIUM 88 MCG: 0.09 TABLET ORAL at 06:21

## 2018-03-24 RX ADMIN — MEROPENEM 1 G: 1 INJECTION, POWDER, FOR SOLUTION INTRAVENOUS at 14:46

## 2018-03-24 RX ADMIN — GUAIFENESIN 400 MG: 400 TABLET ORAL at 09:16

## 2018-03-24 RX ADMIN — OXYCODONE HYDROCHLORIDE AND ACETAMINOPHEN 2 TABLET: 5; 325 TABLET ORAL at 12:58

## 2018-03-24 RX ADMIN — OXYCODONE HYDROCHLORIDE AND ACETAMINOPHEN 2 TABLET: 5; 325 TABLET ORAL at 17:14

## 2018-03-24 RX ADMIN — POTASSIUM CHLORIDE 20 MEQ: 20 TABLET, EXTENDED RELEASE ORAL at 09:16

## 2018-03-24 RX ADMIN — OXYCODONE HYDROCHLORIDE AND ACETAMINOPHEN 2 TABLET: 5; 325 TABLET ORAL at 00:53

## 2018-03-24 RX ADMIN — GABAPENTIN 600 MG: 600 TABLET ORAL at 09:16

## 2018-03-24 RX ADMIN — ALBUTEROL SULFATE 2.5 MG: 2.5 SOLUTION RESPIRATORY (INHALATION) at 16:53

## 2018-03-24 RX ADMIN — ALBUTEROL SULFATE 2.5 MG: 2.5 SOLUTION RESPIRATORY (INHALATION) at 08:19

## 2018-03-24 RX ADMIN — MEROPENEM 1 G: 1 INJECTION, POWDER, FOR SOLUTION INTRAVENOUS at 04:07

## 2018-03-24 RX ADMIN — POTASSIUM CHLORIDE 20 MEQ: 20 TABLET, EXTENDED RELEASE ORAL at 18:46

## 2018-03-24 RX ADMIN — GABAPENTIN 600 MG: 600 TABLET ORAL at 06:21

## 2018-03-24 RX ADMIN — GABAPENTIN 600 MG: 600 TABLET ORAL at 17:14

## 2018-03-24 ASSESSMENT — PAIN SCALES - GENERAL
PAINLEVEL_OUTOF10: 0
PAINLEVEL_OUTOF10: 10
PAINLEVEL_OUTOF10: 8
PAINLEVEL_OUTOF10: 0
PAINLEVEL_OUTOF10: 8
PAINLEVEL_OUTOF10: 10

## 2018-03-24 ASSESSMENT — PAIN DESCRIPTION - PAIN TYPE: TYPE: CHRONIC PAIN

## 2018-03-24 ASSESSMENT — PAIN DESCRIPTION - LOCATION: LOCATION: HIP

## 2018-03-24 ASSESSMENT — PAIN DESCRIPTION - DESCRIPTORS: DESCRIPTORS: ACHING;DISCOMFORT;CONSTANT

## 2018-03-24 ASSESSMENT — PAIN DESCRIPTION - ORIENTATION: ORIENTATION: LEFT

## 2018-03-24 NOTE — CONSULTS
[facility] CONSULT NOTE  Reason for Consult:  SOB  Requesting Physician:  [facility]    Chief Complaint   Patient presents with    Shortness of Breath     hx lung CA. History Obtained From:  PT AND DAUGHTER    HISTORY OF PRESENT ILLNESS              The patient is a [id number] with significant past medical history of CA LUNG who presents with   SOB,CHILLS AND ROJAS. PT HAD DX IN June OF 2017 VIA MAL EFFUSION. STARTED ON CHEMO AND AFTER MAINTENANE  SHE WAS ON HER THIRD ROUND OF IMMUNOTHERAPY AND DEVELOPED SOB. CT SCAN SHOW FT LUNG   MASS AND OBSTRUCTION OF RT AIRWAY C ATELECTASIS AND LESION THAT ARE NEW ON LEFT CONSISTANT  C METS. IN ER PT GIVEN LEVAQUIN AND VANCO AND CEFEPIME.  PT WAS TOLD NOT TO GET FLU SHOT      Past Medical History:   Diagnosis Date    Anxiety     Hyperlipidemia     Lung cancer ([address]     right lung       Past Surgical History:   Procedure Laterality Date    APPENDECTOMY         Current Facility-Administered Medications   Medication Dose Route Frequency Provider Last Rate Last Dose    levofloxacin (LEVAQUIN) 750 MG/150ML infusion 750 mg  750 mg Intravenous Q24H Zeus Oviedo,  mL/hr at 03/22/18 1515 750 mg at 03/22/18 1515    sodium chloride flush 0.9 % injection 10 mL  10 mL Intravenous 2 times per day Zeus Oviedo, DO        sodium chloride flush 0.9 % injection 10 mL  10 mL Intravenous PRN Zeus Oviedo, DO        acetaminophen (TYLENOL) tablet 650 mg  650 mg Oral Q4H PRN Zeus Oviedo, DO        magnesium hydroxide (MILK OF MAGNESIA) 400 MG/5ML suspension 30 mL  30 mL Oral Daily PRN Zeus Oviedo, DO        ondansetron (ZOFRAN) injection 4 mg  4 mg Intravenous Q6H PRN Zeus Oviedo, DO        enoxaparin (LOVENOX) injection 40 mg  40 mg Subcutaneous Daily Zeus Oviedo, DO        0.9 % sodium chloride infusion   Intravenous Continuous Zeus Oviedo,  mL/hr at 03/22/18 1414      gabapentin (NEURONTIN) tablet 600 mg  600 mg Oral 6x Daily Zeus ROBERTO
mainstem airways. However,  it is very important to know the patient has really a chronic right middle,  right lower lobe obstruction based on prior CAT scan, definitely   contributing  to the patient,s hypoxemia. At this time, I do not think the patient will require a bronchoscopy. I  will have to give it a try to see her response with increased chest  physiotherapy. If the patient does not  have an improvement, we may consider a  bronchoscopy for possible removal of plug in this patient. As already  mentioned, the patient does have already documented per prior CT scan two  months ago with endobronchial obstruction of  the distal right mainstem  bronchus.         Villa Garcia MD    D: 03/23/2018 14:46:30       T: 03/23/2018 15:40:50     [id number]  Job#: [id number]     Doc#: [id number]    CC:

## 2018-03-25 LAB
ALBUMIN SERPL-MCNC: 3.2 G/DL (ref 3.5–5.2)
ALP BLD-CCNC: 100 U/L (ref 35–104)
ALT SERPL-CCNC: 19 U/L (ref 0–32)
ANION GAP SERPL CALCULATED.3IONS-SCNC: 13 MMOL/L (ref 7–16)
AST SERPL-CCNC: 13 U/L (ref 0–31)
BASOPHILS ABSOLUTE: 0.07 E9/L (ref 0–0.2)
BASOPHILS RELATIVE PERCENT: 0.5 % (ref 0–2)
BILIRUB SERPL-MCNC: 0.3 MG/DL (ref 0–1.2)
BUN BLDV-MCNC: 11 MG/DL (ref 6–20)
CALCIUM SERPL-MCNC: 9 MG/DL (ref 8.6–10.2)
CHLORIDE BLD-SCNC: 97 MMOL/L (ref 98–107)
CO2: 25 MMOL/L (ref 22–29)
CREAT SERPL-MCNC: 0.4 MG/DL (ref 0.5–1)
EOSINOPHILS ABSOLUTE: 0.1 E9/L (ref 0.05–0.5)
EOSINOPHILS RELATIVE PERCENT: 0.7 % (ref 0–6)
GFR AFRICAN AMERICAN: >60
GFR NON-AFRICAN AMERICAN: >60 ML/MIN/1.73
GLUCOSE BLD-MCNC: 100 MG/DL (ref 74–109)
HCT VFR BLD CALC: 39.2 % (ref 34–48)
HEMOGLOBIN: 13.1 G/DL (ref 11.5–15.5)
IMMATURE GRANULOCYTES #: 0.06 E9/L
IMMATURE GRANULOCYTES %: 0.4 % (ref 0–5)
LYMPHOCYTES ABSOLUTE: 1.11 E9/L (ref 1.5–4)
LYMPHOCYTES RELATIVE PERCENT: 8.2 % (ref 20–42)
MCH RBC QN AUTO: 30.2 PG (ref 26–35)
MCHC RBC AUTO-ENTMCNC: 33.4 % (ref 32–34.5)
MCV RBC AUTO: 90.3 FL (ref 80–99.9)
MONOCYTES ABSOLUTE: 1.23 E9/L (ref 0.1–0.95)
MONOCYTES RELATIVE PERCENT: 9.1 % (ref 2–12)
NEUTROPHILS ABSOLUTE: 10.92 E9/L (ref 1.8–7.3)
NEUTROPHILS RELATIVE PERCENT: 81.1 % (ref 43–80)
PDW BLD-RTO: 13.9 FL (ref 11.5–15)
PLATELET # BLD: 351 E9/L (ref 130–450)
PMV BLD AUTO: 9.3 FL (ref 7–12)
POTASSIUM REFLEX MAGNESIUM: 3.9 MMOL/L (ref 3.5–5)
RBC # BLD: 4.34 E12/L (ref 3.5–5.5)
SODIUM BLD-SCNC: 135 MMOL/L (ref 132–146)
TOTAL PROTEIN: 6.7 G/DL (ref 6.4–8.3)
WBC # BLD: 13.5 E9/L (ref 4.5–11.5)

## 2018-03-25 PROCEDURE — 6370000000 HC RX 637 (ALT 250 FOR IP): Performed by: INTERNAL MEDICINE

## 2018-03-25 PROCEDURE — 80053 COMPREHEN METABOLIC PANEL: CPT

## 2018-03-25 PROCEDURE — 85025 COMPLETE CBC W/AUTO DIFF WBC: CPT

## 2018-03-25 PROCEDURE — 94640 AIRWAY INHALATION TREATMENT: CPT

## 2018-03-25 PROCEDURE — 2580000003 HC RX 258: Performed by: INTERNAL MEDICINE

## 2018-03-25 PROCEDURE — 2060000000 HC ICU INTERMEDIATE R&B

## 2018-03-25 PROCEDURE — 2580000003 HC RX 258: Performed by: SPECIALIST

## 2018-03-25 PROCEDURE — 36415 COLL VENOUS BLD VENIPUNCTURE: CPT

## 2018-03-25 PROCEDURE — 6360000002 HC RX W HCPCS: Performed by: INTERNAL MEDICINE

## 2018-03-25 PROCEDURE — 6360000002 HC RX W HCPCS: Performed by: SPECIALIST

## 2018-03-25 PROCEDURE — 2700000000 HC OXYGEN THERAPY PER DAY

## 2018-03-25 PROCEDURE — 94668 MNPJ CHEST WALL SBSQ: CPT

## 2018-03-25 RX ADMIN — MEROPENEM 1 G: 1 INJECTION, POWDER, FOR SOLUTION INTRAVENOUS at 19:48

## 2018-03-25 RX ADMIN — OXYCODONE HYDROCHLORIDE AND ACETAMINOPHEN 2 TABLET: 5; 325 TABLET ORAL at 09:51

## 2018-03-25 RX ADMIN — OXYCODONE HYDROCHLORIDE AND ACETAMINOPHEN 2 TABLET: 5; 325 TABLET ORAL at 14:05

## 2018-03-25 RX ADMIN — MEROPENEM 1 G: 1 INJECTION, POWDER, FOR SOLUTION INTRAVENOUS at 04:37

## 2018-03-25 RX ADMIN — OXYCODONE HYDROCHLORIDE AND ACETAMINOPHEN 2 TABLET: 5; 325 TABLET ORAL at 18:19

## 2018-03-25 RX ADMIN — ALBUTEROL SULFATE 2.5 MG: 2.5 SOLUTION RESPIRATORY (INHALATION) at 16:19

## 2018-03-25 RX ADMIN — LORAZEPAM 0.5 MG: 0.5 TABLET ORAL at 18:44

## 2018-03-25 RX ADMIN — MEROPENEM 1 G: 1 INJECTION, POWDER, FOR SOLUTION INTRAVENOUS at 13:25

## 2018-03-25 RX ADMIN — Medication 10 ML: at 10:01

## 2018-03-25 RX ADMIN — GUAIFENESIN 400 MG: 400 TABLET ORAL at 09:50

## 2018-03-25 RX ADMIN — POTASSIUM CHLORIDE 20 MEQ: 20 TABLET, EXTENDED RELEASE ORAL at 18:19

## 2018-03-25 RX ADMIN — GABAPENTIN 600 MG: 600 TABLET ORAL at 22:21

## 2018-03-25 RX ADMIN — GABAPENTIN 600 MG: 600 TABLET ORAL at 09:50

## 2018-03-25 RX ADMIN — GUAIFENESIN 400 MG: 400 TABLET ORAL at 18:19

## 2018-03-25 RX ADMIN — GUAIFENESIN 400 MG: 400 TABLET ORAL at 20:22

## 2018-03-25 RX ADMIN — OXYCODONE HYDROCHLORIDE AND ACETAMINOPHEN 2 TABLET: 5; 325 TABLET ORAL at 22:21

## 2018-03-25 RX ADMIN — OXYCODONE HYDROCHLORIDE AND ACETAMINOPHEN 2 TABLET: 5; 325 TABLET ORAL at 05:48

## 2018-03-25 RX ADMIN — ALBUTEROL SULFATE 2.5 MG: 2.5 SOLUTION RESPIRATORY (INHALATION) at 13:12

## 2018-03-25 RX ADMIN — POTASSIUM CHLORIDE 20 MEQ: 20 TABLET, EXTENDED RELEASE ORAL at 09:50

## 2018-03-25 RX ADMIN — ALBUTEROL SULFATE 2.5 MG: 2.5 SOLUTION RESPIRATORY (INHALATION) at 09:22

## 2018-03-25 RX ADMIN — OXYCODONE HYDROCHLORIDE AND ACETAMINOPHEN 2 TABLET: 5; 325 TABLET ORAL at 01:53

## 2018-03-25 RX ADMIN — GABAPENTIN 600 MG: 600 TABLET ORAL at 05:48

## 2018-03-25 RX ADMIN — GUAIFENESIN 400 MG: 400 TABLET ORAL at 13:24

## 2018-03-25 RX ADMIN — ENOXAPARIN SODIUM 40 MG: 40 INJECTION, SOLUTION INTRAVENOUS; SUBCUTANEOUS at 20:23

## 2018-03-25 RX ADMIN — GABAPENTIN 600 MG: 600 TABLET ORAL at 18:18

## 2018-03-25 RX ADMIN — GABAPENTIN 600 MG: 600 TABLET ORAL at 13:24

## 2018-03-25 RX ADMIN — Medication 10 ML: at 19:49

## 2018-03-25 RX ADMIN — GABAPENTIN 600 MG: 600 TABLET ORAL at 19:48

## 2018-03-25 RX ADMIN — ALBUTEROL SULFATE 2.5 MG: 2.5 SOLUTION RESPIRATORY (INHALATION) at 20:49

## 2018-03-25 RX ADMIN — LEVOTHYROXINE SODIUM 88 MCG: 0.09 TABLET ORAL at 05:48

## 2018-03-25 ASSESSMENT — PAIN SCALES - GENERAL
PAINLEVEL_OUTOF10: 10
PAINLEVEL_OUTOF10: 0
PAINLEVEL_OUTOF10: 9
PAINLEVEL_OUTOF10: 0
PAINLEVEL_OUTOF10: 6
PAINLEVEL_OUTOF10: 8
PAINLEVEL_OUTOF10: 0
PAINLEVEL_OUTOF10: 7
PAINLEVEL_OUTOF10: 10
PAINLEVEL_OUTOF10: 10
PAINLEVEL_OUTOF10: 0

## 2018-03-25 ASSESSMENT — PAIN DESCRIPTION - DESCRIPTORS
DESCRIPTORS: ACHING;BURNING;DISCOMFORT
DESCRIPTORS: ACHING;DISCOMFORT
DESCRIPTORS: ACHING;BURNING;DISCOMFORT

## 2018-03-25 ASSESSMENT — PAIN DESCRIPTION - PROGRESSION: CLINICAL_PROGRESSION: RAPIDLY WORSENING

## 2018-03-25 ASSESSMENT — PAIN DESCRIPTION - LOCATION
LOCATION: BACK

## 2018-03-25 ASSESSMENT — PAIN DESCRIPTION - PAIN TYPE
TYPE: CHRONIC PAIN

## 2018-03-25 ASSESSMENT — PAIN DESCRIPTION - FREQUENCY: FREQUENCY: CONTINUOUS

## 2018-03-25 ASSESSMENT — PAIN DESCRIPTION - ONSET: ONSET: ON-GOING

## 2018-03-25 NOTE — PROGRESS NOTES
Infectious Disease  Progress Note  [facility]    Chief Complaint: SOB    Subjective: Patient is awake alert no acute distress. There's been no fevers chills or night sweats; no drainage from his eyes ears nose and throat no nausea or vomiting. Patient has no n/v, or diarrhea, no frequency burning no hemoptysis hematemesis or hematochezia. No focal motor sensory loss psychiatric problems. Negative for new endocrine issues and no blood dyscrasias. Patient admits to non productive cough   Patient denies shortness of breath  Wearing 2L O2 via NC  No new c/o, states that she is feeling pretty good. Up ambulating in room.     Scheduled Meds:   potassium chloride  20 mEq Oral BID WC    albuterol  2.5 mg Nebulization 4x daily    guaiFENesin  400 mg Oral 4x Daily    sodium chloride flush  10 mL Intravenous 2 times per day    enoxaparin  40 mg Subcutaneous Daily    gabapentin  600 mg Oral 6x Daily    levothyroxine  88 mcg Oral QAM AC    meropenem  1 g Intravenous Q8H     Continuous Infusions:   sodium chloride Stopped (03/24/18 2243)     PRN Meds:oxyCODONE-acetaminophen **OR** oxyCODONE-acetaminophen, sodium chloride flush, acetaminophen, magnesium hydroxide, ondansetron, LORazepam    Patient Vitals for the past 24 hrs:   BP Temp Temp src Pulse Resp SpO2 Weight   03/25/18 0945 (!) 132/90 97.9 °F (36.6 °C) Oral 116 16 97 % -   03/25/18 0445 - - - - - - 113 lb 9.6 oz (51.5 kg)   03/24/18 2330 122/71 97.8 °F (36.6 °C) Oral 111 14 92 % -   03/24/18 2121 - - - - - 93 % -   03/24/18 1715 138/70 97.6 °F (36.4 °C) Oral 110 16 94 % -       CBC with Differential:    Lab Results   Component Value Date    WBC 13.5 03/25/2018    RBC 4.34 03/25/2018    HGB 13.1 03/25/2018    HCT 39.2 03/25/2018     03/25/2018    MCV 90.3 03/25/2018    MCH 30.2 03/25/2018    MCHC 33.4 03/25/2018    RDW 13.9 03/25/2018    LYMPHOPCT 8.2 03/25/2018    MONOPCT 9.1 03/25/2018    BASOPCT 0.5 03/25/2018    MONOSABS 1.23 03/25/2018    LYMPHSABS 1.11

## 2018-03-26 VITALS
OXYGEN SATURATION: 98 % | DIASTOLIC BLOOD PRESSURE: 70 MMHG | TEMPERATURE: 96.7 F | WEIGHT: 112.6 LBS | SYSTOLIC BLOOD PRESSURE: 135 MMHG | BODY MASS INDEX: 19.95 KG/M2 | HEIGHT: 63 IN | RESPIRATION RATE: 18 BRPM | HEART RATE: 105 BPM

## 2018-03-26 LAB
ALBUMIN SERPL-MCNC: 3.4 G/DL (ref 3.5–5.2)
ALP BLD-CCNC: 102 U/L (ref 35–104)
ALT SERPL-CCNC: 19 U/L (ref 0–32)
ANION GAP SERPL CALCULATED.3IONS-SCNC: 11 MMOL/L (ref 7–16)
AST SERPL-CCNC: 16 U/L (ref 0–31)
BASOPHILS ABSOLUTE: 0.08 E9/L (ref 0–0.2)
BASOPHILS RELATIVE PERCENT: 0.6 % (ref 0–2)
BILIRUB SERPL-MCNC: 0.4 MG/DL (ref 0–1.2)
BUN BLDV-MCNC: 11 MG/DL (ref 6–20)
CALCIUM SERPL-MCNC: 9.2 MG/DL (ref 8.6–10.2)
CHLORIDE BLD-SCNC: 95 MMOL/L (ref 98–107)
CO2: 25 MMOL/L (ref 22–29)
CREAT SERPL-MCNC: 0.4 MG/DL (ref 0.5–1)
EOSINOPHILS ABSOLUTE: 0.13 E9/L (ref 0.05–0.5)
EOSINOPHILS RELATIVE PERCENT: 1 % (ref 0–6)
GFR AFRICAN AMERICAN: >60
GFR NON-AFRICAN AMERICAN: >60 ML/MIN/1.73
GLUCOSE BLD-MCNC: 97 MG/DL (ref 74–109)
HCT VFR BLD CALC: 38.8 % (ref 34–48)
HEMOGLOBIN: 13.2 G/DL (ref 11.5–15.5)
IMMATURE GRANULOCYTES #: 0.04 E9/L
IMMATURE GRANULOCYTES %: 0.3 % (ref 0–5)
LYMPHOCYTES ABSOLUTE: 1.03 E9/L (ref 1.5–4)
LYMPHOCYTES RELATIVE PERCENT: 8 % (ref 20–42)
MCH RBC QN AUTO: 30.3 PG (ref 26–35)
MCHC RBC AUTO-ENTMCNC: 34 % (ref 32–34.5)
MCV RBC AUTO: 89.2 FL (ref 80–99.9)
MONOCYTES ABSOLUTE: 1.13 E9/L (ref 0.1–0.95)
MONOCYTES RELATIVE PERCENT: 8.8 % (ref 2–12)
NEUTROPHILS ABSOLUTE: 10.45 E9/L (ref 1.8–7.3)
NEUTROPHILS RELATIVE PERCENT: 81.3 % (ref 43–80)
PDW BLD-RTO: 13.8 FL (ref 11.5–15)
PLATELET # BLD: 352 E9/L (ref 130–450)
PMV BLD AUTO: 9.3 FL (ref 7–12)
POTASSIUM REFLEX MAGNESIUM: 4.8 MMOL/L (ref 3.5–5)
RBC # BLD: 4.35 E12/L (ref 3.5–5.5)
SODIUM BLD-SCNC: 131 MMOL/L (ref 132–146)
TOTAL PROTEIN: 6.8 G/DL (ref 6.4–8.3)
WBC # BLD: 12.9 E9/L (ref 4.5–11.5)

## 2018-03-26 PROCEDURE — 2700000000 HC OXYGEN THERAPY PER DAY

## 2018-03-26 PROCEDURE — 6370000000 HC RX 637 (ALT 250 FOR IP): Performed by: INTERNAL MEDICINE

## 2018-03-26 PROCEDURE — 6360000002 HC RX W HCPCS: Performed by: SPECIALIST

## 2018-03-26 PROCEDURE — 36415 COLL VENOUS BLD VENIPUNCTURE: CPT

## 2018-03-26 PROCEDURE — 94667 MNPJ CHEST WALL 1ST: CPT

## 2018-03-26 PROCEDURE — 85025 COMPLETE CBC W/AUTO DIFF WBC: CPT

## 2018-03-26 PROCEDURE — 6370000000 HC RX 637 (ALT 250 FOR IP): Performed by: SPECIALIST

## 2018-03-26 PROCEDURE — 2580000003 HC RX 258: Performed by: INTERNAL MEDICINE

## 2018-03-26 PROCEDURE — 2580000003 HC RX 258: Performed by: SPECIALIST

## 2018-03-26 PROCEDURE — 80053 COMPREHEN METABOLIC PANEL: CPT

## 2018-03-26 PROCEDURE — 94640 AIRWAY INHALATION TREATMENT: CPT

## 2018-03-26 RX ORDER — LEVOFLOXACIN 500 MG/1
500 TABLET, FILM COATED ORAL DAILY
Status: DISCONTINUED | OUTPATIENT
Start: 2018-03-26 | End: 2018-03-26 | Stop reason: HOSPADM

## 2018-03-26 RX ORDER — ALBUTEROL SULFATE 90 UG/1
2 AEROSOL, METERED RESPIRATORY (INHALATION) EVERY 6 HOURS PRN
Status: DISCONTINUED | OUTPATIENT
Start: 2018-03-26 | End: 2018-03-26 | Stop reason: HOSPADM

## 2018-03-26 RX ORDER — LEVOFLOXACIN 500 MG/1
500 TABLET, FILM COATED ORAL DAILY
Qty: 5 TABLET | Refills: 0 | Status: SHIPPED | OUTPATIENT
Start: 2018-03-27 | End: 2018-03-26

## 2018-03-26 RX ORDER — ALBUTEROL SULFATE 90 UG/1
2 AEROSOL, METERED RESPIRATORY (INHALATION) EVERY 6 HOURS PRN
Qty: 1 INHALER | Refills: 0 | Status: SHIPPED | OUTPATIENT
Start: 2018-03-26

## 2018-03-26 RX ORDER — CLINDAMYCIN HYDROCHLORIDE 300 MG/1
300 CAPSULE ORAL 4 TIMES DAILY
Qty: 20 CAPSULE | Refills: 0 | Status: SHIPPED | OUTPATIENT
Start: 2018-03-26 | End: 2018-03-31

## 2018-03-26 RX ORDER — CLINDAMYCIN HYDROCHLORIDE 300 MG/1
300 CAPSULE ORAL 4 TIMES DAILY
Qty: 20 CAPSULE | Refills: 0 | Status: SHIPPED | OUTPATIENT
Start: 2018-03-26 | End: 2018-03-26

## 2018-03-26 RX ORDER — LEVOFLOXACIN 500 MG/1
500 TABLET, FILM COATED ORAL DAILY
Qty: 5 TABLET | Refills: 0 | Status: SHIPPED | OUTPATIENT
Start: 2018-03-27 | End: 2018-04-01

## 2018-03-26 RX ORDER — TRAMADOL HYDROCHLORIDE 50 MG/1
50 TABLET ORAL EVERY 6 HOURS PRN
Status: DISCONTINUED | OUTPATIENT
Start: 2018-03-26 | End: 2018-03-26 | Stop reason: HOSPADM

## 2018-03-26 RX ORDER — CLINDAMYCIN HYDROCHLORIDE 150 MG/1
300 CAPSULE ORAL EVERY 6 HOURS SCHEDULED
Status: DISCONTINUED | OUTPATIENT
Start: 2018-03-26 | End: 2018-03-26 | Stop reason: HOSPADM

## 2018-03-26 RX ADMIN — MEROPENEM 1 G: 1 INJECTION, POWDER, FOR SOLUTION INTRAVENOUS at 04:38

## 2018-03-26 RX ADMIN — GABAPENTIN 600 MG: 600 TABLET ORAL at 10:08

## 2018-03-26 RX ADMIN — LORAZEPAM 0.5 MG: 0.5 TABLET ORAL at 04:38

## 2018-03-26 RX ADMIN — TRAMADOL HYDROCHLORIDE 50 MG: 50 TABLET, FILM COATED ORAL at 14:58

## 2018-03-26 RX ADMIN — CLINDAMYCIN HYDROCHLORIDE 300 MG: 150 CAPSULE ORAL at 14:58

## 2018-03-26 RX ADMIN — POTASSIUM CHLORIDE 20 MEQ: 20 TABLET, EXTENDED RELEASE ORAL at 16:43

## 2018-03-26 RX ADMIN — GABAPENTIN 600 MG: 600 TABLET ORAL at 06:02

## 2018-03-26 RX ADMIN — TRAMADOL HYDROCHLORIDE 50 MG: 50 TABLET, FILM COATED ORAL at 08:49

## 2018-03-26 RX ADMIN — GUAIFENESIN 400 MG: 400 TABLET ORAL at 14:09

## 2018-03-26 RX ADMIN — OXYCODONE HYDROCHLORIDE AND ACETAMINOPHEN 2 TABLET: 5; 325 TABLET ORAL at 06:00

## 2018-03-26 RX ADMIN — LEVOTHYROXINE SODIUM 88 MCG: 0.09 TABLET ORAL at 04:43

## 2018-03-26 RX ADMIN — ALBUTEROL SULFATE 2.5 MG: 2.5 SOLUTION RESPIRATORY (INHALATION) at 08:31

## 2018-03-26 RX ADMIN — MEROPENEM 1 G: 1 INJECTION, POWDER, FOR SOLUTION INTRAVENOUS at 11:30

## 2018-03-26 RX ADMIN — OXYCODONE HYDROCHLORIDE AND ACETAMINOPHEN 2 TABLET: 5; 325 TABLET ORAL at 14:09

## 2018-03-26 RX ADMIN — GABAPENTIN 600 MG: 600 TABLET ORAL at 16:42

## 2018-03-26 RX ADMIN — OXYCODONE HYDROCHLORIDE AND ACETAMINOPHEN 2 TABLET: 5; 325 TABLET ORAL at 01:53

## 2018-03-26 RX ADMIN — POTASSIUM CHLORIDE 20 MEQ: 20 TABLET, EXTENDED RELEASE ORAL at 08:49

## 2018-03-26 RX ADMIN — GABAPENTIN 600 MG: 600 TABLET ORAL at 14:09

## 2018-03-26 RX ADMIN — ALBUTEROL SULFATE 2.5 MG: 2.5 SOLUTION RESPIRATORY (INHALATION) at 12:34

## 2018-03-26 RX ADMIN — LEVOFLOXACIN 500 MG: 500 TABLET, FILM COATED ORAL at 14:29

## 2018-03-26 RX ADMIN — GUAIFENESIN 400 MG: 400 TABLET ORAL at 08:49

## 2018-03-26 RX ADMIN — OXYCODONE HYDROCHLORIDE AND ACETAMINOPHEN 2 TABLET: 5; 325 TABLET ORAL at 10:08

## 2018-03-26 RX ADMIN — Medication 10 ML: at 11:30

## 2018-03-26 RX ADMIN — Medication 10 ML: at 08:50

## 2018-03-26 ASSESSMENT — PAIN DESCRIPTION - PAIN TYPE
TYPE: CHRONIC PAIN

## 2018-03-26 ASSESSMENT — PAIN SCALES - GENERAL
PAINLEVEL_OUTOF10: 5
PAINLEVEL_OUTOF10: 9
PAINLEVEL_OUTOF10: 8
PAINLEVEL_OUTOF10: 3
PAINLEVEL_OUTOF10: 10
PAINLEVEL_OUTOF10: 9
PAINLEVEL_OUTOF10: 8
PAINLEVEL_OUTOF10: 10

## 2018-03-26 ASSESSMENT — PAIN DESCRIPTION - LOCATION
LOCATION: BACK;HIP

## 2018-03-26 ASSESSMENT — PAIN DESCRIPTION - ONSET
ONSET: ON-GOING

## 2018-03-26 ASSESSMENT — PAIN DESCRIPTION - PROGRESSION
CLINICAL_PROGRESSION: NOT CHANGED

## 2018-03-26 ASSESSMENT — PAIN DESCRIPTION - DESCRIPTORS: DESCRIPTORS: ACHING;CONSTANT;DISCOMFORT;DULL;NAGGING

## 2018-03-26 ASSESSMENT — PAIN DESCRIPTION - ORIENTATION
ORIENTATION: LOWER

## 2018-03-26 ASSESSMENT — PAIN DESCRIPTION - FREQUENCY
FREQUENCY: CONTINUOUS

## 2018-03-26 NOTE — DISCHARGE SUMMARY
Medication List as of 3/26/2018  4:37 PM      CONTINUE these medications which have CHANGED    Details   levofloxacin (LEVAQUIN) 500 MG tablet Take 1 tablet by mouth daily for 5 days, Disp-5 tablet, R-0Normal      clindamycin (CLEOCIN) 300 MG capsule Take 1 capsule by mouth 4 times daily for 5 days, Disp-20 capsule, R-0Normal           Discharge Medication List as of 3/26/2018  4:37 PM      STOP taking these medications       morphine (BECKIE) 30 MG extended release capsule Comments:   Reason for Stopping:         folic acid (FOLVITE) 1 MG tablet Comments:   Reason for Stopping:         simvastatin (ZOCOR) 20 MG tablet Comments:   Reason for Stopping:         diclofenac (VOLTAREN) 75 MG EC tablet Comments:   Reason for Stopping:             Discharge Medication List as of 3/26/2018  4:37 PM      START taking these medications    Details   albuterol sulfate  (90 Base) MCG/ACT inhaler Inhale 2 puffs into the lungs every 6 hours as needed for Wheezing, Disp-1 Inhaler, R-0Normal             INTERNAL MEDICINE FOLLOW UP/INSTRUCTIONS:  · Follow-up with primary care physician as directed in discharge paperwork. · She will need a repeat bronchoscopy soon. · Please review results of imaging studies with PCP. · Follow-up with all consultants as directed by them. · If recurrence or worsening of symptoms go to the ED or call primary care physician. · Diet: DIET GENERAL    Preparing for this patient's discharge, including paperwork, orders, instructions, and meeting with patient did required 34 minutes.     Electronically signed by Ezio Bach DO on 3/29/2018 at 7:32 AM

## 2018-03-26 NOTE — PROGRESS NOTES
Message sent to Dr. Reese Avalos regarding discharge. Awaiting call back/note as warranted.   Electronically signed by Asher Alvarez RN on 3/26/2018 at 12:26 PM

## 2018-03-26 NOTE — PROGRESS NOTES
Infectious Disease  Progress Note  [facility]    Chief Complaint: SOB    Subjective: Patient is awake alert no acute distress. There's been no fevers chills or night sweats; no drainage from his eyes ears nose and throat no nausea or vomiting. Patient has no n/v, or diarrhea, no frequency burning no hemoptysis hematemesis or hematochezia. No focal motor sensory loss psychiatric problems. Negative for new endocrine issues and no blood dyscrasias. Patient admits to non productive cough   Patient denies shortness of breath  Wearing 2L O2 via NC  No new c/o, states that she is feeling pretty good. Up ambulating in room.     Scheduled Meds:   potassium chloride  20 mEq Oral BID WC    albuterol  2.5 mg Nebulization 4x daily    guaiFENesin  400 mg Oral 4x Daily    sodium chloride flush  10 mL Intravenous 2 times per day    enoxaparin  40 mg Subcutaneous Daily    gabapentin  600 mg Oral 6x Daily    levothyroxine  88 mcg Oral QAM AC    meropenem  1 g Intravenous Q8H     Continuous Infusions:   sodium chloride Stopped (03/24/18 2243)     PRN Meds:traMADol, oxyCODONE-acetaminophen **OR** oxyCODONE-acetaminophen, sodium chloride flush, acetaminophen, magnesium hydroxide, ondansetron, LORazepam    Patient Vitals for the past 24 hrs:   BP Temp Temp src Pulse Resp SpO2 Weight   03/26/18 0849 135/70 96.7 °F (35.9 °C) Oral 105 20 94 % -   03/26/18 0833 - - - - 20 94 % -   03/26/18 0815 122/65 97.9 °F (36.6 °C) Oral 100 18 95 % -   03/26/18 0520 - - - - - - 112 lb 9.6 oz (51.1 kg)   03/25/18 2340 135/78 98.7 °F (37.1 °C) Oral 108 16 - -   03/25/18 1952 - - - - - 94 % -   03/25/18 1800 136/72 97.8 °F (36.6 °C) Oral 112 16 93 % -       CBC with Differential:    Lab Results   Component Value Date    WBC 12.9 03/26/2018    RBC 4.35 03/26/2018    HGB 13.2 03/26/2018    HCT 38.8 03/26/2018     03/26/2018    MCV 89.2 03/26/2018    MCH 30.3 03/26/2018    MCHC 34.0 03/26/2018    RDW 13.8 03/26/2018    LYMPHOPCT 8.0 03/26/2018 MONOPCT 8.8 03/26/2018    BASOPCT 0.6 03/26/2018    MONOSABS 1.13 03/26/2018    LYMPHSABS 1.03 03/26/2018    EOSABS 0.13 03/26/2018    BASOSABS 0.08 03/26/2018     CMP:    Lab Results   Component Value Date     03/26/2018    K 4.8 03/26/2018    CL 95 03/26/2018    CO2 25 03/26/2018    BUN 11 03/26/2018    CREATININE 0.4 03/26/2018    GFRAA >60 03/26/2018    LABGLOM >60 03/26/2018    GLUCOSE 97 03/26/2018    PROT 6.8 03/26/2018    LABALBU 3.4 03/26/2018    CALCIUM 9.2 03/26/2018    BILITOT 0.4 03/26/2018    ALKPHOS 102 03/26/2018    AST 16 03/26/2018    ALT 19 03/26/2018       /70   Pulse 105   Temp 96.7 °F (35.9 °C) (Oral)   Resp 20   Ht 5' 3\" (1.6 m)   Wt 112 lb 9.6 oz (51.1 kg)   SpO2 94%   BMI 19.95 kg/m²     Physical Exam  Const/Neuro- No signs of acute distress, Alert, Cooperative  ENMT- Within Normal Limits, Normocephalic, mucous membranes pink/moist, No thrush  Neck: Neck supple  Heart- Regular, Rate, Rhythm- no murmur appreciated. Lungs- Diminished breath sounds on right to auscultation. Abdomen- Soft, bowel sounds positive, non tender  Musculo/Extremities-  Equal and symmetrical, no edema. No tenderness. Skin:  Warm and dry, free from rashes. Cultures reviewed  3/22/18 Nares negative   3/22/18 BC negative so far  3/22/18 S pneumoniae/Legionella urine Ag negative  3/22/18 Resp panel negative  3/22/18 Rapid influenza A/B Ag negative    Radiology reviewed  CTA PULMONARY W CONTRAST   Final Result   1. No pulmonary thousand. 2. Infiltrative right perihilar mass with obstruction of the bronchus   intermedius and consolidation and atelectasis of the right middle and   lower lobes. Masslike tissue extends across the infrahilar region   along the left bronchus. Significant volume loss in the right   hemithorax. 3. Multiple bilateral pulmonary nodules, larger on the left since the   prior study, concerning for metastatic disease. 4. Small left pleural effusion.  Small pericardial effusion. 5. Osteoblastic lesions throughout the spine and left and right ribs,   concerning for metastatic disease. XR CHEST PORTABLE   Final Result   ALERT:  THIS IS AN ABNORMAL REPORT   1. Persistent right basilar opacification concerning for malignancy in   this patient with a history of cancer of the bronchus of the right   lower lobe. 2. Possible airspace disease in the left lung base. 3. Pulmonary nodules on comparison CT scan chest 1/18/2018, not well   appreciated on this study.           Assessment  RT LUNG COLLAPSE  R/O POST OBSTRUCTIVE PNEUMONIA        FROM CA VS MUCUS PLUG  APPEARS TO HAVE METS TO LEFT LUNG    Plan   STOP MERREM AND CONVERT TO LEVAQUIN AND CLINDA  Pulmonary following, patient using chest vest  No plans for BAL noted    Electronically signed by Julius Ramirez MD on 3/26/2018 at 11:37 AM     P3/26/2018

## 2018-03-26 NOTE — PROGRESS NOTES
Dr. Jerardo Solomon said ok to go, follow up with Dr. Nikky Oneal outpatient.   Electronically signed by Jhoana Masterson RN on 3/26/2018 at 12:32 PM

## 2018-03-26 NOTE — PROGRESS NOTES
Dr. Lisa Bonilla called via 0051 Halina Flores per Charge RN request regarding D/C med req / Bebe Jorgensen Stamp 3/26/18 3:58 PM

## 2018-03-26 NOTE — PROGRESS NOTES
[facility] Quality Flow/Interdisciplinary Rounds Progress Note        Quality Flow Rounds held on March 26, 2018    Disciplines Attending:  Bedside Nurse, ,  and Nursing Unit Leadership    Lorrie Goldberg was admitted on 3/22/2018  7:23 AM    Anticipated Discharge Date:  Expected Discharge Date: 03/31/18    Disposition:    Toney Score:  Toney Scale Score: 20    Readmission Risk              Readmission Risk:        12.75       Age 72 or Greater:  0    Admitted from SNF or Requires Paid or Family Care:  0    Currently has CHF,COPD,ARF,CRI,or is on dialysis:  0    Takes more than 5 Prescription Medications:  4    Takes Digoxin,Insulin,Anticoagulants,Narcotics or ASA/Plavix:  [address] in Past 12 Months:  0    On Disability:  3    Patient Considers own Health:  3.75          Discussed patient goal for the day, patient clinical progression, and barriers to discharge. The following Goal(s) of the Day/Commitment(s) have been identified:  Discharge today.     Meherrin Shah  March 26, 2018

## 2018-03-26 NOTE — PROGRESS NOTES
Pulse ox was __94____% on room air at rest.  Ambulated patient on room air. Oxygen saturation was ____89__% on room air while ambulating. Oxygen applied. Recovery pulse ox was ______% on _______ liters of oxygen while ambulating.

## 2018-03-27 LAB
BLOOD CULTURE, ROUTINE: NORMAL
CULTURE, BLOOD 2: NORMAL

## 2018-04-05 ENCOUNTER — HOSPITAL ENCOUNTER (OUTPATIENT)
Dept: CT IMAGING | Age: 58
Discharge: HOME | End: 2018-04-07
Payer: COMMERCIAL

## 2018-04-05 DIAGNOSIS — C34.31 CANCER OF BRONCHUS OF RIGHT LOWER LOBE (HCC): ICD-10-CM

## 2018-04-05 PROCEDURE — 6360000004 HC RX CONTRAST MEDICATION: Performed by: RADIOLOGY

## 2018-04-05 PROCEDURE — 71260 CT THORAX DX C+: CPT

## 2018-04-05 RX ADMIN — IOPAMIDOL 80 ML: 755 INJECTION, SOLUTION INTRAVENOUS at 07:28

## 2018-04-07 LAB
EKG ATRIAL RATE: 103 BPM
EKG P AXIS: 65 DEGREES
EKG P-R INTERVAL: 118 MS
EKG Q-T INTERVAL: 348 MS
EKG QRS DURATION: 86 MS
EKG QTC CALCULATION (BAZETT): 455 MS
EKG R AXIS: -130 DEGREES
EKG T AXIS: 55 DEGREES
EKG VENTRICULAR RATE: 103 BPM

## 2018-04-13 RX ORDER — FOLIC ACID 1 MG/1
1 TABLET ORAL EVERY MORNING
COMMUNITY

## 2018-04-16 ENCOUNTER — APPOINTMENT (OUTPATIENT)
Dept: GENERAL RADIOLOGY | Age: 58
End: 2018-04-16
Payer: COMMERCIAL

## 2018-04-16 ENCOUNTER — HOSPITAL ENCOUNTER (EMERGENCY)
Age: 58
Discharge: HOME | End: 2018-04-16
Attending: EMERGENCY MEDICINE
Payer: COMMERCIAL

## 2018-04-16 ENCOUNTER — APPOINTMENT (OUTPATIENT)
Dept: CT IMAGING | Age: 58
End: 2018-04-16
Payer: COMMERCIAL

## 2018-04-16 VITALS
TEMPERATURE: 98.4 F | WEIGHT: 100 LBS | OXYGEN SATURATION: 97 % | SYSTOLIC BLOOD PRESSURE: 122 MMHG | RESPIRATION RATE: 20 BRPM | HEART RATE: 100 BPM | DIASTOLIC BLOOD PRESSURE: 68 MMHG | HEIGHT: 63 IN | BODY MASS INDEX: 17.72 KG/M2

## 2018-04-16 DIAGNOSIS — E86.0 DEHYDRATION: Primary | ICD-10-CM

## 2018-04-16 DIAGNOSIS — C34.00 MALIGNANT NEOPLASM OF HILUS OF LUNG, UNSPECIFIED LATERALITY (HCC): ICD-10-CM

## 2018-04-16 DIAGNOSIS — R63.0 ANOREXIA: ICD-10-CM

## 2018-04-16 DIAGNOSIS — R41.0 CONFUSION: ICD-10-CM

## 2018-04-16 LAB
ALBUMIN SERPL-MCNC: 4 G/DL (ref 3.5–5.2)
ALP BLD-CCNC: 144 U/L (ref 35–104)
ALT SERPL-CCNC: 12 U/L (ref 0–32)
AMMONIA: 40.2 UMOL/L (ref 11–51)
ANION GAP SERPL CALCULATED.3IONS-SCNC: 18 MMOL/L (ref 7–16)
APTT: 23.7 SEC (ref 24.5–35.1)
AST SERPL-CCNC: 16 U/L (ref 0–31)
ATYPICAL LYMPHOCYTE RELATIVE PERCENT: 0.9 % (ref 0–4)
BACTERIA: ABNORMAL /HPF
BASOPHILS ABSOLUTE: 0 E9/L (ref 0–0.2)
BASOPHILS RELATIVE PERCENT: 0 % (ref 0–2)
BILIRUB SERPL-MCNC: 0.9 MG/DL (ref 0–1.2)
BILIRUBIN URINE: NEGATIVE
BLOOD, URINE: NEGATIVE
BUN BLDV-MCNC: 20 MG/DL (ref 6–20)
CALCIUM SERPL-MCNC: 8.9 MG/DL (ref 8.6–10.2)
CHLORIDE BLD-SCNC: 98 MMOL/L (ref 98–107)
CLARITY: CLEAR
CO2: 21 MMOL/L (ref 22–29)
COLOR: YELLOW
CREAT SERPL-MCNC: 0.4 MG/DL (ref 0.5–1)
EOSINOPHILS ABSOLUTE: 0 E9/L (ref 0.05–0.5)
EOSINOPHILS RELATIVE PERCENT: 0 % (ref 0–6)
EPITHELIAL CELLS, UA: ABNORMAL /HPF
GFR AFRICAN AMERICAN: >60
GFR NON-AFRICAN AMERICAN: >60 ML/MIN/1.73
GLUCOSE BLD-MCNC: 143 MG/DL (ref 74–109)
GLUCOSE URINE: NEGATIVE MG/DL
HCT VFR BLD CALC: 42.7 % (ref 34–48)
HEMOGLOBIN: 14.6 G/DL (ref 11.5–15.5)
INR BLD: 1.3
KETONES, URINE: 15 MG/DL
LACTIC ACID: 1.3 MMOL/L (ref 0.5–2.2)
LEUKOCYTE ESTERASE, URINE: NEGATIVE
LYMPHOCYTES ABSOLUTE: 0.58 E9/L (ref 1.5–4)
LYMPHOCYTES RELATIVE PERCENT: 3.5 % (ref 20–42)
MCH RBC QN AUTO: 30.4 PG (ref 26–35)
MCHC RBC AUTO-ENTMCNC: 34.2 % (ref 32–34.5)
MCV RBC AUTO: 88.8 FL (ref 80–99.9)
METER GLUCOSE: 140 MG/DL (ref 70–110)
MONOCYTES ABSOLUTE: 1.02 E9/L (ref 0.1–0.95)
MONOCYTES RELATIVE PERCENT: 6.9 % (ref 2–12)
NEUTROPHILS ABSOLUTE: 12.91 E9/L (ref 1.8–7.3)
NEUTROPHILS RELATIVE PERCENT: 88.7 % (ref 43–80)
NITRITE, URINE: NEGATIVE
NUCLEATED RED BLOOD CELLS: 0 /100 WBC
PDW BLD-RTO: 14.4 FL (ref 11.5–15)
PH UA: 7 (ref 5–9)
PLATELET # BLD: 284 E9/L (ref 130–450)
PMV BLD AUTO: 10.6 FL (ref 7–12)
POTASSIUM SERPL-SCNC: 3.3 MMOL/L (ref 3.5–5)
PROTEIN UA: ABNORMAL MG/DL
PROTHROMBIN TIME: 14.7 SEC (ref 9.3–12.4)
RBC # BLD: 4.81 E12/L (ref 3.5–5.5)
RBC # BLD: NORMAL 10*6/UL
RBC UA: ABNORMAL /HPF (ref 0–2)
SODIUM BLD-SCNC: 137 MMOL/L (ref 132–146)
SPECIFIC GRAVITY UA: 1.01 (ref 1–1.03)
TOTAL PROTEIN: 7.1 G/DL (ref 6.4–8.3)
UROBILINOGEN, URINE: 0.2 E.U./DL
WBC # BLD: 14.5 E9/L (ref 4.5–11.5)
WBC UA: ABNORMAL /HPF (ref 0–5)

## 2018-04-16 PROCEDURE — 99284 EMERGENCY DEPT VISIT MOD MDM: CPT

## 2018-04-16 PROCEDURE — 83605 ASSAY OF LACTIC ACID: CPT

## 2018-04-16 PROCEDURE — 82962 GLUCOSE BLOOD TEST: CPT

## 2018-04-16 PROCEDURE — 85610 PROTHROMBIN TIME: CPT

## 2018-04-16 PROCEDURE — 2580000003 HC RX 258: Performed by: EMERGENCY MEDICINE

## 2018-04-16 PROCEDURE — 85025 COMPLETE CBC W/AUTO DIFF WBC: CPT

## 2018-04-16 PROCEDURE — 96361 HYDRATE IV INFUSION ADD-ON: CPT

## 2018-04-16 PROCEDURE — 87077 CULTURE AEROBIC IDENTIFY: CPT

## 2018-04-16 PROCEDURE — 96360 HYDRATION IV INFUSION INIT: CPT

## 2018-04-16 PROCEDURE — 71045 X-RAY EXAM CHEST 1 VIEW: CPT

## 2018-04-16 PROCEDURE — 81001 URINALYSIS AUTO W/SCOPE: CPT

## 2018-04-16 PROCEDURE — 80053 COMPREHEN METABOLIC PANEL: CPT

## 2018-04-16 PROCEDURE — 85730 THROMBOPLASTIN TIME PARTIAL: CPT

## 2018-04-16 PROCEDURE — 87040 BLOOD CULTURE FOR BACTERIA: CPT

## 2018-04-16 PROCEDURE — 87088 URINE BACTERIA CULTURE: CPT

## 2018-04-16 PROCEDURE — 70450 CT HEAD/BRAIN W/O DYE: CPT

## 2018-04-16 PROCEDURE — 82140 ASSAY OF AMMONIA: CPT

## 2018-04-16 PROCEDURE — 87186 SC STD MICRODIL/AGAR DIL: CPT

## 2018-04-16 RX ORDER — SODIUM CHLORIDE 9 MG/ML
INJECTION, SOLUTION INTRAVENOUS ONCE
Status: COMPLETED | OUTPATIENT
Start: 2018-04-16 | End: 2018-04-16

## 2018-04-16 RX ORDER — PREDNISONE 20 MG/1
20 TABLET ORAL EVERY MORNING
COMMUNITY
Start: 2018-03-28

## 2018-04-16 RX ORDER — 0.9 % SODIUM CHLORIDE 0.9 %
30 INTRAVENOUS SOLUTION INTRAVENOUS ONCE
Status: COMPLETED | OUTPATIENT
Start: 2018-04-16 | End: 2018-04-16

## 2018-04-16 RX ORDER — MEGESTROL ACETATE 40 MG/ML
800 SUSPENSION ORAL EVERY MORNING
COMMUNITY

## 2018-04-16 RX ORDER — GUAIFENESIN AND DEXTROMETHORPHAN HYDROBROMIDE 100; 10 MG/5ML; MG/5ML
10 SOLUTION ORAL EVERY 4 HOURS PRN
COMMUNITY

## 2018-04-16 RX ADMIN — SODIUM CHLORIDE 1362 ML: 9 INJECTION, SOLUTION INTRAVENOUS at 11:36

## 2018-04-16 RX ADMIN — SODIUM CHLORIDE: 9 INJECTION, SOLUTION INTRAVENOUS at 09:54

## 2018-04-16 ASSESSMENT — PAIN DESCRIPTION - LOCATION: LOCATION: HIP

## 2018-04-16 ASSESSMENT — PAIN DESCRIPTION - PAIN TYPE: TYPE: CHRONIC PAIN

## 2018-04-16 ASSESSMENT — PAIN SCALES - GENERAL: PAINLEVEL_OUTOF10: 4

## 2018-04-18 LAB
ORGANISM: ABNORMAL
ORGANISM: ABNORMAL
URINE CULTURE, ROUTINE: ABNORMAL

## 2018-04-20 ENCOUNTER — ANESTHESIA EVENT (OUTPATIENT)
Dept: ENDOSCOPY | Age: 58
End: 2018-04-20
Payer: COMMERCIAL

## 2018-04-20 ENCOUNTER — ANESTHESIA (OUTPATIENT)
Dept: ENDOSCOPY | Age: 58
End: 2018-04-20
Payer: COMMERCIAL

## 2018-04-20 ENCOUNTER — HOSPITAL ENCOUNTER (OUTPATIENT)
Age: 58
Setting detail: OUTPATIENT SURGERY
Discharge: HOME | End: 2018-04-20
Attending: INTERNAL MEDICINE | Admitting: INTERNAL MEDICINE
Payer: COMMERCIAL

## 2018-04-20 VITALS
SYSTOLIC BLOOD PRESSURE: 138 MMHG | DIASTOLIC BLOOD PRESSURE: 72 MMHG | RESPIRATION RATE: 13 BRPM | OXYGEN SATURATION: 99 %

## 2018-04-20 VITALS
SYSTOLIC BLOOD PRESSURE: 122 MMHG | BODY MASS INDEX: 17.89 KG/M2 | HEART RATE: 107 BPM | DIASTOLIC BLOOD PRESSURE: 77 MMHG | TEMPERATURE: 98 F | WEIGHT: 101 LBS | RESPIRATION RATE: 16 BRPM | OXYGEN SATURATION: 90 % | HEIGHT: 63 IN

## 2018-04-20 PROCEDURE — 3700000001 HC ADD 15 MINUTES (ANESTHESIA): Performed by: INTERNAL MEDICINE

## 2018-04-20 PROCEDURE — 7100000011 HC PHASE II RECOVERY - ADDTL 15 MIN: Performed by: INTERNAL MEDICINE

## 2018-04-20 PROCEDURE — 3700000000 HC ANESTHESIA ATTENDED CARE: Performed by: INTERNAL MEDICINE

## 2018-04-20 PROCEDURE — 3609011500 HC BRONCHOSCOPY DIAGNOSTIC OR CELL WASH ONLY: Performed by: INTERNAL MEDICINE

## 2018-04-20 PROCEDURE — 7100000010 HC PHASE II RECOVERY - FIRST 15 MIN: Performed by: INTERNAL MEDICINE

## 2018-04-20 PROCEDURE — 6360000002 HC RX W HCPCS: Performed by: NURSE ANESTHETIST, CERTIFIED REGISTERED

## 2018-04-20 PROCEDURE — 2580000003 HC RX 258: Performed by: INTERNAL MEDICINE

## 2018-04-20 PROCEDURE — 6370000000 HC RX 637 (ALT 250 FOR IP): Performed by: INTERNAL MEDICINE

## 2018-04-20 RX ORDER — SODIUM CHLORIDE 9 MG/ML
INJECTION, SOLUTION INTRAVENOUS CONTINUOUS
Status: DISCONTINUED | OUTPATIENT
Start: 2018-04-20 | End: 2018-04-20 | Stop reason: HOSPADM

## 2018-04-20 RX ORDER — FENTANYL CITRATE 50 UG/ML
25 INJECTION, SOLUTION INTRAMUSCULAR; INTRAVENOUS EVERY 5 MIN PRN
Status: DISCONTINUED | OUTPATIENT
Start: 2018-04-20 | End: 2018-04-20 | Stop reason: HOSPADM

## 2018-04-20 RX ORDER — SODIUM CHLORIDE 0.9 % (FLUSH) 0.9 %
10 SYRINGE (ML) INJECTION PRN
Status: DISCONTINUED | OUTPATIENT
Start: 2018-04-20 | End: 2018-04-20 | Stop reason: HOSPADM

## 2018-04-20 RX ORDER — SODIUM CHLORIDE 0.9 % (FLUSH) 0.9 %
10 SYRINGE (ML) INJECTION EVERY 12 HOURS SCHEDULED
Status: DISCONTINUED | OUTPATIENT
Start: 2018-04-20 | End: 2018-04-20 | Stop reason: HOSPADM

## 2018-04-20 RX ORDER — PROPOFOL 10 MG/ML
INJECTION, EMULSION INTRAVENOUS PRN
Status: DISCONTINUED | OUTPATIENT
Start: 2018-04-20 | End: 2018-04-20 | Stop reason: SDUPTHER

## 2018-04-20 RX ORDER — MEPERIDINE HYDROCHLORIDE 50 MG/ML
12.5 INJECTION INTRAMUSCULAR; INTRAVENOUS; SUBCUTANEOUS EVERY 5 MIN PRN
Status: DISCONTINUED | OUTPATIENT
Start: 2018-04-20 | End: 2018-04-20 | Stop reason: HOSPADM

## 2018-04-20 RX ORDER — ONDANSETRON 2 MG/ML
4 INJECTION INTRAMUSCULAR; INTRAVENOUS
Status: DISCONTINUED | OUTPATIENT
Start: 2018-04-20 | End: 2018-04-20 | Stop reason: HOSPADM

## 2018-04-20 RX ORDER — MORPHINE SULFATE 2 MG/ML
1 INJECTION, SOLUTION INTRAMUSCULAR; INTRAVENOUS EVERY 5 MIN PRN
Status: DISCONTINUED | OUTPATIENT
Start: 2018-04-20 | End: 2018-04-20 | Stop reason: HOSPADM

## 2018-04-20 RX ADMIN — SODIUM CHLORIDE: 9 INJECTION, SOLUTION INTRAVENOUS at 08:16

## 2018-04-20 RX ADMIN — PROPOFOL 40 MG: 10 INJECTION, EMULSION INTRAVENOUS at 08:19

## 2018-04-20 RX ADMIN — PROPOFOL 30 MG: 10 INJECTION, EMULSION INTRAVENOUS at 08:20

## 2018-04-20 ASSESSMENT — PULMONARY FUNCTION TESTS
PIF_VALUE: 0
PIF_VALUE: 1
PIF_VALUE: 0

## 2018-04-20 ASSESSMENT — PAIN SCALES - GENERAL
PAINLEVEL_OUTOF10: 0

## 2018-04-21 ENCOUNTER — HOSPITAL ENCOUNTER (EMERGENCY)
Age: 58
Discharge: HOME | End: 2018-04-21
Attending: EMERGENCY MEDICINE
Payer: COMMERCIAL

## 2018-04-21 VITALS
DIASTOLIC BLOOD PRESSURE: 77 MMHG | WEIGHT: 100 LBS | SYSTOLIC BLOOD PRESSURE: 153 MMHG | TEMPERATURE: 97.9 F | RESPIRATION RATE: 16 BRPM | BODY MASS INDEX: 17.72 KG/M2 | HEART RATE: 99 BPM | HEIGHT: 63 IN | OXYGEN SATURATION: 95 %

## 2018-04-21 DIAGNOSIS — R09.02 HYPOXIA: Primary | ICD-10-CM

## 2018-04-21 DIAGNOSIS — C34.90 LUNG CANCER, PRIMARY, WITH METASTASIS FROM LUNG TO OTHER SITE, UNSPECIFIED LATERALITY (HCC): ICD-10-CM

## 2018-04-21 DIAGNOSIS — J44.1 COPD EXACERBATION (HCC): ICD-10-CM

## 2018-04-21 LAB
BLOOD CULTURE, ROUTINE: NORMAL
CULTURE, BLOOD 2: NORMAL

## 2018-04-21 PROCEDURE — 99284 EMERGENCY DEPT VISIT MOD MDM: CPT

## 2018-04-21 ASSESSMENT — ENCOUNTER SYMPTOMS
DIARRHEA: 0
VOMITING: 0
NAUSEA: 0
WHEEZING: 0
SHORTNESS OF BREATH: 1
ABDOMINAL DISTENTION: 0
STRIDOR: 0
CHEST TIGHTNESS: 0
PHOTOPHOBIA: 0
ABDOMINAL PAIN: 0

## 2018-04-23 ENCOUNTER — HOSPITAL ENCOUNTER (OUTPATIENT)
Dept: NUCLEAR MEDICINE | Age: 58
Discharge: HOME | End: 2018-04-23
Payer: COMMERCIAL

## 2018-04-23 DIAGNOSIS — C34.31 CANCER OF BRONCHUS OF RIGHT LOWER LOBE (HCC): ICD-10-CM

## 2018-04-23 PROCEDURE — 78306 BONE IMAGING WHOLE BODY: CPT

## 2018-04-23 PROCEDURE — 3430000000 HC RX DIAGNOSTIC RADIOPHARMACEUTICAL

## 2018-04-23 PROCEDURE — A9503 TC99M MEDRONATE: HCPCS

## 2018-04-23 RX ORDER — TC 99M MEDRONATE 20 MG/10ML
25 INJECTION, POWDER, LYOPHILIZED, FOR SOLUTION INTRAVENOUS
Status: DISCONTINUED | OUTPATIENT
Start: 2018-04-23 | End: 2018-04-24 | Stop reason: HOSPADM

## 2018-04-24 ENCOUNTER — HOSPITAL ENCOUNTER (EMERGENCY)
Age: 58
Discharge: HOME | End: 2018-04-24
Attending: EMERGENCY MEDICINE
Payer: COMMERCIAL

## 2018-04-24 ENCOUNTER — APPOINTMENT (OUTPATIENT)
Dept: GENERAL RADIOLOGY | Age: 58
End: 2018-04-24
Payer: COMMERCIAL

## 2018-04-24 VITALS
HEART RATE: 115 BPM | RESPIRATION RATE: 14 BRPM | SYSTOLIC BLOOD PRESSURE: 137 MMHG | TEMPERATURE: 97.5 F | HEIGHT: 63 IN | OXYGEN SATURATION: 92 % | BODY MASS INDEX: 17.72 KG/M2 | DIASTOLIC BLOOD PRESSURE: 68 MMHG | WEIGHT: 100 LBS

## 2018-04-24 DIAGNOSIS — S93.601A FOOT SPRAIN, RIGHT, INITIAL ENCOUNTER: Primary | ICD-10-CM

## 2018-04-24 PROCEDURE — 73630 X-RAY EXAM OF FOOT: CPT

## 2018-04-24 PROCEDURE — 99283 EMERGENCY DEPT VISIT LOW MDM: CPT

## 2018-04-24 RX ORDER — NAPROXEN 500 MG/1
500 TABLET ORAL 2 TIMES DAILY
Qty: 14 TABLET | Refills: 0 | Status: SHIPPED | OUTPATIENT
Start: 2018-04-24 | End: 2018-05-01

## 2018-04-24 RX ORDER — HYDROCODONE BITARTRATE AND ACETAMINOPHEN 5; 325 MG/1; MG/1
1 TABLET ORAL EVERY 6 HOURS PRN
Qty: 6 TABLET | Refills: 0 | Status: SHIPPED | OUTPATIENT
Start: 2018-04-24 | End: 2018-04-27

## 2018-04-24 ASSESSMENT — PAIN DESCRIPTION - DESCRIPTORS: DESCRIPTORS: SHARP

## 2018-04-24 ASSESSMENT — PAIN DESCRIPTION - PAIN TYPE: TYPE: ACUTE PAIN

## 2018-04-24 ASSESSMENT — PAIN DESCRIPTION - LOCATION: LOCATION: FOOT

## 2018-04-24 ASSESSMENT — PAIN DESCRIPTION - ORIENTATION: ORIENTATION: RIGHT

## 2018-04-24 ASSESSMENT — PAIN SCALES - GENERAL: PAINLEVEL_OUTOF10: 7

## 2018-04-24 ASSESSMENT — PAIN DESCRIPTION - FREQUENCY: FREQUENCY: INTERMITTENT

## 2018-04-30 ENCOUNTER — HOSPITAL ENCOUNTER (OUTPATIENT)
Dept: RADIATION ONCOLOGY | Age: 58
Discharge: HOME | End: 2018-04-30
Payer: COMMERCIAL

## 2018-04-30 DIAGNOSIS — C80.1 CANCER (HCC): Primary | ICD-10-CM

## 2018-04-30 PROCEDURE — 99999 PR OFFICE/OUTPT VISIT,PROCEDURE ONLY: CPT | Performed by: RADIOLOGY

## 2018-05-02 NOTE — PROGRESS NOTES
Radiation Oncology          Pk Johnston was a no show for her scheduled appointment today which was confirmed prior. We will continue to try and reschedule. Paresh Captain.  Jayda Dennison MD [address]  Cell: [phone]    [address]:  [phone]   FAX: [phone]  [facility]:  [address] [place] [address] Avenue:    [phone] [address]:  [address] [place] [address] Road:  [phone]

## 2018-06-01 LAB
EKG ATRIAL RATE: 120 BPM
EKG P AXIS: 74 DEGREES
EKG P-R INTERVAL: 140 MS
EKG Q-T INTERVAL: 336 MS
EKG QRS DURATION: 78 MS
EKG QTC CALCULATION (BAZETT): 474 MS
EKG R AXIS: -119 DEGREES
EKG T AXIS: 53 DEGREES
EKG VENTRICULAR RATE: 120 BPM

## 2022-04-21 NOTE — PROGRESS NOTES
Spoke to Dr. Edythe Gottron re: consult. Will be up to see patient. Called Dr. Melvina Lin office, Dr. Natalee España cover for Dr. Thai Lyles. Detail Level: Detailed

## (undated) DEVICE — CANNULA NSL ORAL AD FOR CAPNOFLEX CO2 O2 AIRLFE